# Patient Record
Sex: FEMALE | Race: WHITE | NOT HISPANIC OR LATINO | Employment: FULL TIME | URBAN - METROPOLITAN AREA
[De-identification: names, ages, dates, MRNs, and addresses within clinical notes are randomized per-mention and may not be internally consistent; named-entity substitution may affect disease eponyms.]

---

## 2019-11-11 ENCOUNTER — LAB (OUTPATIENT)
Dept: LAB | Facility: HOSPITAL | Age: 39
End: 2019-11-11

## 2019-11-11 ENCOUNTER — TRANSCRIBE ORDERS (OUTPATIENT)
Dept: LAB | Facility: HOSPITAL | Age: 39
End: 2019-11-11

## 2019-11-11 DIAGNOSIS — R63.5 ABNORMAL WEIGHT GAIN: ICD-10-CM

## 2019-11-11 DIAGNOSIS — R63.5 ABNORMAL WEIGHT GAIN: Primary | ICD-10-CM

## 2019-11-11 LAB
25(OH)D3 SERPL-MCNC: 25.3 NG/ML (ref 30–100)
ALBUMIN SERPL-MCNC: 3.9 G/DL (ref 3.5–5.2)
ALBUMIN/GLOB SERPL: 1.2 G/DL
ALP SERPL-CCNC: 60 U/L (ref 39–117)
ALT SERPL W P-5'-P-CCNC: 9 U/L (ref 1–33)
ANION GAP SERPL CALCULATED.3IONS-SCNC: 9.2 MMOL/L (ref 5–15)
AST SERPL-CCNC: 15 U/L (ref 1–32)
BILIRUB SERPL-MCNC: 0.5 MG/DL (ref 0.2–1.2)
BUN BLD-MCNC: 13 MG/DL (ref 6–20)
BUN/CREAT SERPL: 20.6 (ref 7–25)
CALCIUM SPEC-SCNC: 9.1 MG/DL (ref 8.6–10.5)
CHLORIDE SERPL-SCNC: 104 MMOL/L (ref 98–107)
CHOLEST SERPL-MCNC: 178 MG/DL (ref 0–200)
CO2 SERPL-SCNC: 28.8 MMOL/L (ref 22–29)
CREAT BLD-MCNC: 0.63 MG/DL (ref 0.57–1)
DEPRECATED RDW RBC AUTO: 40.5 FL (ref 37–54)
ERYTHROCYTE [DISTWIDTH] IN BLOOD BY AUTOMATED COUNT: 11.9 % (ref 12.3–15.4)
GFR SERPL CREATININE-BSD FRML MDRD: 106 ML/MIN/1.73
GLOBULIN UR ELPH-MCNC: 3.3 GM/DL
GLUCOSE BLD-MCNC: 75 MG/DL (ref 65–99)
HBA1C MFR BLD: 4.9 % (ref 4.8–5.6)
HCT VFR BLD AUTO: 37.1 % (ref 34–46.6)
HDLC SERPL-MCNC: 53 MG/DL (ref 40–60)
HGB BLD-MCNC: 12.5 G/DL (ref 12–15.9)
LDLC SERPL CALC-MCNC: 114 MG/DL (ref 0–100)
LDLC/HDLC SERPL: 2.16 {RATIO}
MCH RBC QN AUTO: 31.1 PG (ref 26.6–33)
MCHC RBC AUTO-ENTMCNC: 33.7 G/DL (ref 31.5–35.7)
MCV RBC AUTO: 92.3 FL (ref 79–97)
PLATELET # BLD AUTO: 317 10*3/MM3 (ref 140–450)
PMV BLD AUTO: 9.8 FL (ref 6–12)
POTASSIUM BLD-SCNC: 4.4 MMOL/L (ref 3.5–5.2)
PROT SERPL-MCNC: 7.2 G/DL (ref 6–8.5)
RBC # BLD AUTO: 4.02 10*6/MM3 (ref 3.77–5.28)
SODIUM BLD-SCNC: 142 MMOL/L (ref 136–145)
TRIGL SERPL-MCNC: 53 MG/DL (ref 0–150)
TSH SERPL DL<=0.05 MIU/L-ACNC: 2.45 UIU/ML (ref 0.27–4.2)
VLDLC SERPL-MCNC: 10.6 MG/DL (ref 5–40)
WBC NRBC COR # BLD: 10.26 10*3/MM3 (ref 3.4–10.8)

## 2019-11-11 PROCEDURE — 80053 COMPREHEN METABOLIC PANEL: CPT | Performed by: PHYSICIAN ASSISTANT

## 2019-11-11 PROCEDURE — 80061 LIPID PANEL: CPT

## 2019-11-11 PROCEDURE — 36415 COLL VENOUS BLD VENIPUNCTURE: CPT | Performed by: PHYSICIAN ASSISTANT

## 2019-11-11 PROCEDURE — 83036 HEMOGLOBIN GLYCOSYLATED A1C: CPT

## 2019-11-11 PROCEDURE — 85027 COMPLETE CBC AUTOMATED: CPT

## 2019-11-11 PROCEDURE — 82306 VITAMIN D 25 HYDROXY: CPT

## 2019-11-11 PROCEDURE — 84443 ASSAY THYROID STIM HORMONE: CPT

## 2020-07-18 ENCOUNTER — HOSPITAL ENCOUNTER (INPATIENT)
Facility: HOSPITAL | Age: 40
LOS: 3 days | Discharge: HOME | End: 2020-07-21
Attending: EMERGENCY MEDICINE | Admitting: FAMILY MEDICINE

## 2020-07-18 ENCOUNTER — HOSPITAL ENCOUNTER (EMERGENCY)
Facility: HOSPITAL | Age: 40
Discharge: HOME | End: 2020-07-18
Attending: EMERGENCY MEDICINE | Admitting: EMERGENCY MEDICINE

## 2020-07-18 ENCOUNTER — APPOINTMENT (OUTPATIENT)
Dept: GENERAL RADIOLOGY | Facility: HOSPITAL | Age: 40
End: 2020-07-18

## 2020-07-18 VITALS
TEMPERATURE: 97.7 F | SYSTOLIC BLOOD PRESSURE: 99 MMHG | BODY MASS INDEX: 30.53 KG/M2 | RESPIRATION RATE: 16 BRPM | WEIGHT: 190 LBS | DIASTOLIC BLOOD PRESSURE: 60 MMHG | HEART RATE: 90 BPM | OXYGEN SATURATION: 98 % | HEIGHT: 66 IN

## 2020-07-18 DIAGNOSIS — L03.314 CELLULITIS OF GROIN: ICD-10-CM

## 2020-07-18 DIAGNOSIS — N75.0 INFECTED CYST OF BARTHOLIN'S GLAND DUCT: Primary | ICD-10-CM

## 2020-07-18 DIAGNOSIS — R50.9 ACUTE FEBRILE ILLNESS: Primary | ICD-10-CM

## 2020-07-18 DIAGNOSIS — N76.4 LABIAL ABSCESS: ICD-10-CM

## 2020-07-18 PROBLEM — N75.1 BARTHOLIN'S GLAND ABSCESS: Status: ACTIVE | Noted: 2020-07-18

## 2020-07-18 PROBLEM — A41.9 SEPSIS: Status: ACTIVE | Noted: 2020-07-18

## 2020-07-18 PROBLEM — E66.3 OVERWEIGHT: Chronic | Status: ACTIVE | Noted: 2020-07-18

## 2020-07-18 PROBLEM — E28.2 PCOS (POLYCYSTIC OVARIAN SYNDROME): Chronic | Status: ACTIVE | Noted: 2020-07-18

## 2020-07-18 LAB
ALBUMIN SERPL-MCNC: 4.1 G/DL (ref 3.5–5.2)
ALBUMIN/GLOB SERPL: 1.5 G/DL
ALP SERPL-CCNC: 66 U/L (ref 39–117)
ALT SERPL W P-5'-P-CCNC: 12 U/L (ref 1–33)
ANION GAP SERPL CALCULATED.3IONS-SCNC: 10 MMOL/L (ref 5–15)
AST SERPL-CCNC: 15 U/L (ref 1–32)
BASOPHILS # BLD AUTO: 0.05 10*3/MM3 (ref 0–0.2)
BASOPHILS NFR BLD AUTO: 0.2 % (ref 0–1.5)
BILIRUB SERPL-MCNC: 0.5 MG/DL (ref 0–1.2)
BILIRUB UR QL STRIP: NEGATIVE
BUN SERPL-MCNC: 11 MG/DL (ref 6–20)
BUN/CREAT SERPL: 16.9 (ref 7–25)
CALCIUM SPEC-SCNC: 8.8 MG/DL (ref 8.6–10.5)
CHLORIDE SERPL-SCNC: 102 MMOL/L (ref 98–107)
CLARITY UR: CLEAR
CO2 SERPL-SCNC: 22 MMOL/L (ref 22–29)
COLOR UR: YELLOW
CREAT SERPL-MCNC: 0.65 MG/DL (ref 0.57–1)
D-LACTATE SERPL-SCNC: 0.7 MMOL/L (ref 0.5–2)
DEPRECATED RDW RBC AUTO: 38.6 FL (ref 37–54)
EOSINOPHIL # BLD AUTO: 0.01 10*3/MM3 (ref 0–0.4)
EOSINOPHIL NFR BLD AUTO: 0 % (ref 0.3–6.2)
ERYTHROCYTE [DISTWIDTH] IN BLOOD BY AUTOMATED COUNT: 11.9 % (ref 12.3–15.4)
GFR SERPL CREATININE-BSD FRML MDRD: 103 ML/MIN/1.73
GLOBULIN UR ELPH-MCNC: 2.8 GM/DL
GLUCOSE SERPL-MCNC: 89 MG/DL (ref 65–99)
GLUCOSE UR STRIP-MCNC: NEGATIVE MG/DL
HCT VFR BLD AUTO: 37.7 % (ref 34–46.6)
HGB BLD-MCNC: 13 G/DL (ref 12–15.9)
HGB UR QL STRIP.AUTO: NEGATIVE
IMM GRANULOCYTES # BLD AUTO: 0.18 10*3/MM3 (ref 0–0.05)
IMM GRANULOCYTES NFR BLD AUTO: 0.9 % (ref 0–0.5)
KETONES UR QL STRIP: NEGATIVE
LEUKOCYTE ESTERASE UR QL STRIP.AUTO: NEGATIVE
LYMPHOCYTES # BLD AUTO: 0.92 10*3/MM3 (ref 0.7–3.1)
LYMPHOCYTES NFR BLD AUTO: 4.4 % (ref 19.6–45.3)
MCH RBC QN AUTO: 31 PG (ref 26.6–33)
MCHC RBC AUTO-ENTMCNC: 34.5 G/DL (ref 31.5–35.7)
MCV RBC AUTO: 90 FL (ref 79–97)
MONOCYTES # BLD AUTO: 1.45 10*3/MM3 (ref 0.1–0.9)
MONOCYTES NFR BLD AUTO: 6.9 % (ref 5–12)
NEUTROPHILS NFR BLD AUTO: 18.44 10*3/MM3 (ref 1.7–7)
NEUTROPHILS NFR BLD AUTO: 87.6 % (ref 42.7–76)
NITRITE UR QL STRIP: NEGATIVE
NRBC BLD AUTO-RTO: 0 /100 WBC (ref 0–0.2)
PH UR STRIP.AUTO: 7 [PH] (ref 5–8)
PLATELET # BLD AUTO: 298 10*3/MM3 (ref 140–450)
PMV BLD AUTO: 9.5 FL (ref 6–12)
POTASSIUM SERPL-SCNC: 3.8 MMOL/L (ref 3.5–5.2)
PROCALCITONIN SERPL-MCNC: 0.07 NG/ML (ref 0–0.25)
PROT SERPL-MCNC: 6.9 G/DL (ref 6–8.5)
PROT UR QL STRIP: NEGATIVE
RBC # BLD AUTO: 4.19 10*6/MM3 (ref 3.77–5.28)
SODIUM SERPL-SCNC: 134 MMOL/L (ref 136–145)
SP GR UR STRIP: 1.02 (ref 1–1.03)
UROBILINOGEN UR QL STRIP: NORMAL
WBC # BLD AUTO: 21.05 10*3/MM3 (ref 3.4–10.8)

## 2020-07-18 PROCEDURE — 85025 COMPLETE CBC W/AUTO DIFF WBC: CPT | Performed by: EMERGENCY MEDICINE

## 2020-07-18 PROCEDURE — 25010000002 PIPERACILLIN SOD-TAZOBACTAM PER 1 G: Performed by: EMERGENCY MEDICINE

## 2020-07-18 PROCEDURE — 25010000002 ONDANSETRON PER 1 MG: Performed by: EMERGENCY MEDICINE

## 2020-07-18 PROCEDURE — 99284 EMERGENCY DEPT VISIT MOD MDM: CPT

## 2020-07-18 PROCEDURE — 25010000002 HYDROMORPHONE PER 4 MG: Performed by: NURSE PRACTITIONER

## 2020-07-18 PROCEDURE — 80053 COMPREHEN METABOLIC PANEL: CPT | Performed by: EMERGENCY MEDICINE

## 2020-07-18 PROCEDURE — 71045 X-RAY EXAM CHEST 1 VIEW: CPT

## 2020-07-18 PROCEDURE — 83605 ASSAY OF LACTIC ACID: CPT | Performed by: EMERGENCY MEDICINE

## 2020-07-18 PROCEDURE — 81003 URINALYSIS AUTO W/O SCOPE: CPT | Performed by: EMERGENCY MEDICINE

## 2020-07-18 PROCEDURE — 99223 1ST HOSP IP/OBS HIGH 75: CPT | Performed by: INTERNAL MEDICINE

## 2020-07-18 PROCEDURE — 25010000002 HYDROMORPHONE PER 4 MG: Performed by: EMERGENCY MEDICINE

## 2020-07-18 PROCEDURE — 87040 BLOOD CULTURE FOR BACTERIA: CPT | Performed by: EMERGENCY MEDICINE

## 2020-07-18 PROCEDURE — 25010000002 VANCOMYCIN 10 G RECONSTITUTED SOLUTION: Performed by: EMERGENCY MEDICINE

## 2020-07-18 PROCEDURE — 36415 COLL VENOUS BLD VENIPUNCTURE: CPT

## 2020-07-18 PROCEDURE — 84145 PROCALCITONIN (PCT): CPT | Performed by: NURSE PRACTITIONER

## 2020-07-18 PROCEDURE — 25010000002 DIPHENHYDRAMINE PER 50 MG: Performed by: INTERNAL MEDICINE

## 2020-07-18 RX ORDER — SULFAMETHOXAZOLE AND TRIMETHOPRIM 800; 160 MG/1; MG/1
2 TABLET ORAL 2 TIMES DAILY
Qty: 28 TABLET | Refills: 0 | Status: SHIPPED | OUTPATIENT
Start: 2020-07-18 | End: 2020-07-18

## 2020-07-18 RX ORDER — ACETAMINOPHEN 500 MG
1000 TABLET ORAL ONCE
Status: COMPLETED | OUTPATIENT
Start: 2020-07-18 | End: 2020-07-18

## 2020-07-18 RX ORDER — ONDANSETRON 2 MG/ML
4 INJECTION INTRAMUSCULAR; INTRAVENOUS EVERY 6 HOURS PRN
Status: DISCONTINUED | OUTPATIENT
Start: 2020-07-18 | End: 2020-07-21 | Stop reason: HOSPADM

## 2020-07-18 RX ORDER — TRAMADOL HYDROCHLORIDE 50 MG/1
50 TABLET ORAL EVERY 6 HOURS PRN
Status: DISCONTINUED | OUTPATIENT
Start: 2020-07-18 | End: 2020-07-21 | Stop reason: HOSPADM

## 2020-07-18 RX ORDER — ACETAMINOPHEN 325 MG/1
650 TABLET ORAL EVERY 4 HOURS PRN
Status: DISCONTINUED | OUTPATIENT
Start: 2020-07-18 | End: 2020-07-21 | Stop reason: HOSPADM

## 2020-07-18 RX ORDER — SODIUM CHLORIDE 0.9 % (FLUSH) 0.9 %
10 SYRINGE (ML) INJECTION AS NEEDED
Status: DISCONTINUED | OUTPATIENT
Start: 2020-07-18 | End: 2020-07-21 | Stop reason: HOSPADM

## 2020-07-18 RX ORDER — OXYCODONE HYDROCHLORIDE AND ACETAMINOPHEN 5; 325 MG/1; MG/1
1 TABLET ORAL EVERY 4 HOURS PRN
Qty: 15 TABLET | Refills: 0 | Status: SHIPPED | OUTPATIENT
Start: 2020-07-18 | End: 2020-07-18

## 2020-07-18 RX ORDER — HYDROMORPHONE HYDROCHLORIDE 1 MG/ML
0.5 INJECTION, SOLUTION INTRAMUSCULAR; INTRAVENOUS; SUBCUTANEOUS
Status: DISCONTINUED | OUTPATIENT
Start: 2020-07-18 | End: 2020-07-21 | Stop reason: HOSPADM

## 2020-07-18 RX ORDER — OXYCODONE HYDROCHLORIDE AND ACETAMINOPHEN 5; 325 MG/1; MG/1
1 TABLET ORAL ONCE
Status: COMPLETED | OUTPATIENT
Start: 2020-07-18 | End: 2020-07-18

## 2020-07-18 RX ORDER — NALOXONE HCL 0.4 MG/ML
0.4 VIAL (ML) INJECTION
Status: DISCONTINUED | OUTPATIENT
Start: 2020-07-18 | End: 2020-07-21 | Stop reason: HOSPADM

## 2020-07-18 RX ORDER — ONDANSETRON 2 MG/ML
4 INJECTION INTRAMUSCULAR; INTRAVENOUS ONCE
Status: COMPLETED | OUTPATIENT
Start: 2020-07-18 | End: 2020-07-18

## 2020-07-18 RX ORDER — HYDROMORPHONE HYDROCHLORIDE 1 MG/ML
0.5 INJECTION, SOLUTION INTRAMUSCULAR; INTRAVENOUS; SUBCUTANEOUS ONCE
Status: COMPLETED | OUTPATIENT
Start: 2020-07-18 | End: 2020-07-18

## 2020-07-18 RX ORDER — SODIUM CHLORIDE 0.9 % (FLUSH) 0.9 %
10 SYRINGE (ML) INJECTION EVERY 12 HOURS SCHEDULED
Status: DISCONTINUED | OUTPATIENT
Start: 2020-07-18 | End: 2020-07-21 | Stop reason: HOSPADM

## 2020-07-18 RX ORDER — LIDOCAINE HYDROCHLORIDE AND EPINEPHRINE 10; 10 MG/ML; UG/ML
10 INJECTION, SOLUTION INFILTRATION; PERINEURAL ONCE
Status: COMPLETED | OUTPATIENT
Start: 2020-07-18 | End: 2020-07-18

## 2020-07-18 RX ORDER — DIPHENHYDRAMINE HYDROCHLORIDE 50 MG/ML
25 INJECTION INTRAMUSCULAR; INTRAVENOUS ONCE
Status: COMPLETED | OUTPATIENT
Start: 2020-07-18 | End: 2020-07-18

## 2020-07-18 RX ORDER — ONDANSETRON 4 MG/1
4 TABLET, FILM COATED ORAL EVERY 6 HOURS PRN
Status: DISCONTINUED | OUTPATIENT
Start: 2020-07-18 | End: 2020-07-21 | Stop reason: HOSPADM

## 2020-07-18 RX ORDER — SPIRONOLACTONE 100 MG/1
100 TABLET, FILM COATED ORAL
Status: ON HOLD | COMMUNITY
End: 2020-07-21 | Stop reason: SDUPTHER

## 2020-07-18 RX ORDER — CEPHALEXIN 500 MG/1
500 CAPSULE ORAL 4 TIMES DAILY
Qty: 28 CAPSULE | Refills: 0 | Status: SHIPPED | OUTPATIENT
Start: 2020-07-18 | End: 2020-07-18

## 2020-07-18 RX ORDER — SODIUM CHLORIDE 9 MG/ML
100 INJECTION, SOLUTION INTRAVENOUS CONTINUOUS
Status: DISCONTINUED | OUTPATIENT
Start: 2020-07-18 | End: 2020-07-19

## 2020-07-18 RX ADMIN — HYDROMORPHONE HYDROCHLORIDE 0.5 MG: 1 INJECTION, SOLUTION INTRAMUSCULAR; INTRAVENOUS; SUBCUTANEOUS at 16:20

## 2020-07-18 RX ADMIN — TAZOBACTAM SODIUM AND PIPERACILLIN SODIUM 3.38 G: 375; 3 INJECTION, SOLUTION INTRAVENOUS at 16:21

## 2020-07-18 RX ADMIN — HYDROMORPHONE HYDROCHLORIDE 0.5 MG: 1 INJECTION, SOLUTION INTRAMUSCULAR; INTRAVENOUS; SUBCUTANEOUS at 19:05

## 2020-07-18 RX ADMIN — TAZOBACTAM SODIUM AND PIPERACILLIN SODIUM 3.38 G: 375; 3 INJECTION, SOLUTION INTRAVENOUS at 23:28

## 2020-07-18 RX ADMIN — ONDANSETRON 4 MG: 2 INJECTION INTRAMUSCULAR; INTRAVENOUS at 16:20

## 2020-07-18 RX ADMIN — SODIUM CHLORIDE 1000 ML: 9 INJECTION, SOLUTION INTRAVENOUS at 16:18

## 2020-07-18 RX ADMIN — OXYCODONE HYDROCHLORIDE AND ACETAMINOPHEN 1 TABLET: 5; 325 TABLET ORAL at 05:12

## 2020-07-18 RX ADMIN — DIPHENHYDRAMINE HYDROCHLORIDE 25 MG: 50 INJECTION INTRAMUSCULAR; INTRAVENOUS at 19:02

## 2020-07-18 RX ADMIN — ACETAMINOPHEN 650 MG: 325 TABLET, FILM COATED ORAL at 23:27

## 2020-07-18 RX ADMIN — HYDROMORPHONE HYDROCHLORIDE 0.5 MG: 1 INJECTION, SOLUTION INTRAMUSCULAR; INTRAVENOUS; SUBCUTANEOUS at 23:32

## 2020-07-18 RX ADMIN — VANCOMYCIN HYDROCHLORIDE 1750 MG: 10 INJECTION, POWDER, LYOPHILIZED, FOR SOLUTION INTRAVENOUS at 17:23

## 2020-07-18 RX ADMIN — LIDOCAINE HYDROCHLORIDE,EPINEPHRINE BITARTRATE 10 ML: 10; .01 INJECTION, SOLUTION INFILTRATION; PERINEURAL at 06:20

## 2020-07-18 RX ADMIN — SODIUM CHLORIDE 100 ML/HR: 9 INJECTION, SOLUTION INTRAVENOUS at 19:05

## 2020-07-18 RX ADMIN — ACETAMINOPHEN 1000 MG: 500 TABLET, FILM COATED ORAL at 17:23

## 2020-07-18 NOTE — H&P
[facility] Medicine Services  HISTORY AND PHYSICAL    Patient Name: Margarita Adair  : 1980  MRN: [id number]  Primary Care Physician: Nadira Pritchard APRN  Date of admission: 2020      Subjective   Subjective     Chief Complaint:  Rt labial abscess with associated n/v, fever     HPI:  Margarita Adair is a 39 y.o. female with pmh of PCOS, fatty liver, overweight and remote tobacco abuse (quit in ).  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  As a note, pt had a known COVID exposure on , tested negative on  and has worked throughout without any fever or respiratory s/s, and no loss of taste or smell.  She was under daily monitoring of temp and symptoms until 2 days ago when she was release as her 14 days had passed.     Today pt presents to ER this am with cc of Rt labial lesion that was pea sized and appeared on Friday.  Pain worsened and yesterday the lesion was approx grape sized.  No fever, chills, n/v, cough, h/a, diarrhea.  Pain worsened and she was seen in our ER this am and dxd with Rt bartholin's gland abscess.  Lesion was lanced and pt was started on Bactrim DS and given percocet.  Dcd home.  Later today she developed fever of 102 and n/v with worsening rt groin pain.  She came back to ER.  Dxd with sepsis associated with abscess.  Blood cx pending. Started on Vanc/zosyn and IVFs. Will admit to hospitalist service and consult ID and gyn.      Review of Systems   Constitutional: Positive for activity change, chills and fever. Negative for fatigue.   HENT: Negative for congestion, sinus pressure, sinus pain, sore throat and trouble swallowing.         No loss of taste or smell    Eyes: Negative.    Respiratory: Negative for cough, chest tightness, shortness of breath and wheezing.    Cardiovascular: Negative.    Gastrointestinal: Positive for nausea and vomiting. Negative for abdominal pain and diarrhea.   Endocrine:  Negative.    Genitourinary: Negative.    Musculoskeletal: Negative.    Skin: Positive for wound.        Rt labial abscess s/p I&D in ER this am with packing.  Very tender rt groin    Allergic/Immunologic: Negative.    Neurological: Negative.    Hematological: Negative.    Psychiatric/Behavioral: Negative.         All other systems reviewed and are negative.     Personal History     Past Medical History:   Diagnosis Date   • Fatty liver    • Overweight    • PCOS (polycystic ovarian syndrome)        Past Surgical History:   Procedure Laterality Date   • ADENOIDECTOMY     • APPENDECTOMY     • CHOLECYSTECTOMY     • TONSILLECTOMY         Family History: family history includes ADD / ADHD in her son; Autism in her daughter; Breast cancer in her mother; Hypertension in her father; No Known Problems in her brother. Otherwise pertinent FHx was reviewed and unremarkable.     Social History:  reports that she quit smoking about 14 years ago. She has never used smokeless tobacco. She reports that she drinks alcohol. She reports that she does not use drugs.  Social History     Social History Narrative   • Not on file       Medications:  Available home medication information reviewed.  Medications Prior to Admission   Medication Sig Dispense Refill Last Dose   • spironolactone (ALDACTONE) 100 MG tablet 100 mg.   7/17/2020 at Unknown time       Allergies   Allergen Reactions   • Oxycodone GI Intolerance       Objective   Objective     Vital Signs:   Temp:  [97.7 °F (36.5 °C)-102.1 °F (38.9 °C)] 99.6 °F (37.6 °C)  Heart Rate:  [] 88  Resp:  [16-20] 18  BP: ()/(56-97) 102/56        Physical Exam   Constitutional: Awake, alert. Sitting up in bed in ER in [facility].  No visitors at bs.   Eyes: PERRLA, sclerae anicteric, no conjunctival injection  HENT: NCAT, mucous membranes moist  Neck: Supple, no thyromegaly, no lymphadenopathy, trachea midline  Respiratory: Clear to auscultation bilaterally A/P, nonlabored respirations on RA  (note pt is wearing her own SN95 mask in ER on exam)  Cardiovascular: sinus tach, no murmurs, rubs, or gallops, palpable pedal pulses bilaterally  Gastrointestinal: Positive bowel sounds, soft, nontender, nondistended. Overweight  Musculoskeletal: No bilateral ankle edema, no clubbing or cyanosis to extremities. FERGUSON spontaneously   Psychiatric: Appropriate affect, cooperative and calm   Neurologic: Oriented x 3, strength symmetric in all extremities, Cranial Nerves grossly intact to confrontation, speech clear and appropriate.  Follows commands   Skin: Rt labia majora with posterior area of mild erythema. Also noted area lanced and wick/packing in place.  VERY ttp to rt groin. Unable to assess adequately for lymphadenopathy due to significant pain.       Results Reviewed:  I have personally reviewed current lab and radiology data.    Results from last 7 days   Lab Units 07/18/20  1559   WBC 10*3/mm3 21.05*   HEMOGLOBIN g/dL 13.0   HEMATOCRIT % 37.7   PLATELETS 10*3/mm3 298     Results from last 7 days   Lab Units 07/18/20  1559   SODIUM mmol/L 134*   POTASSIUM mmol/L 3.8   CHLORIDE mmol/L 102   CO2 mmol/L 22.0   BUN mg/dL 11   CREATININE mg/dL 0.65   GLUCOSE mg/dL 89   CALCIUM mg/dL 8.8   ALT (SGPT) U/L 12   AST (SGOT) U/L 15   LACTATE mmol/L 0.7   PROCALCITONIN ng/mL 0.07     Estimated Creatinine Clearance: 131.1 mL/min (by C-G formula based on SCr of 0.65 mg/dL).  Brief Urine Lab Results  (Last result in the past 365 days)      Color   Clarity   Blood   Leuk Est   Nitrite   Protein   CREAT   Urine HCG        07/18/20 1618 Yellow Clear Negative Negative Negative Negative             Imaging Results (Last 24 Hours)     Procedure Component Value Units Date/Time    XR Chest 1 View [[id number]] Collected:  07/18/20 1557     Updated:  07/18/20 1825    Narrative:          EXAMINATION: XR CHEST 1 VW - 07/18/2020     INDICATION: Fever      COMPARISON: NONE     FINDINGS: Portable chest reveals cardiac and mediastinal  silhouettes  within normal limits. The lung fields are clear. No focal parenchymal  opacification is present. No pleural effusion or pneumothorax. The bony  structures are unremarkable. The pulmonary vascularity is within normal  limits.         Impression:       No acute cardiopulmonary disease.     DICTATED:   07/18/2020  EDITED/ls :   07/18/2020                    Assessment/Plan   Assessment & Plan     Active Hospital Problems    Diagnosis POA   • **Labial abscess [N76.4] Unknown     Priority: High   • Sepsis (CMS/HCC) [A41.9] Yes     Priority: High   • PCOS (polycystic ovarian syndrome) [E28.2] Unknown   • Overweight [E66.3] Unknown     Margarita Adair is a 39 y.o. female with pmh of PCOS, fatty liver, overweight and remote tobacco abuse (quit in 2006).  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  As a note, pt had a known COVID exposure on 7/1, tested negative on 7/2 and has worked throughout without any fever or respiratory s/s, and no loss of taste or smell.  She was under daily monitoring of temp and symptoms until 2 days ago when she was release as her 14 days had passed.     Today pt presents to ER this am with cc of Rt labial lesion that was pea sized and appeared on Friday.  Pain worsened and yesterday the lesion was approx grape sized.  No fever, chills, n/v, cough, h/a, diarrhea. She reports she has some left over bactrim and had taken 2 doses prior to coming to ER today.  Pain worsened and she was seen in our ER this am and dxd with Rt bartholin's gland abscess.  Lesion was lanced/packed and pt was started on Bactrim DS and given percocet.  Dcd home.  Later today she developed fever of 102 and n/v with worsening rt groin pain.  She came back to ER.  Dxd with sepsis associated with abscess.  Blood cx pending. Started on Vanc/zosyn and IVFs. Will admit to hospitalist service and consult ID and gyn.      Assessment/plan:    Sepsis (fever, tachycardia,  leukocytosis)   (tachy, mild hypotension, WBC 21.05, fever 102.1 and source)  Right labia majora abscess/cellulitis  Rt labia pain  --s/p I&D and packed in ER this am. Was sent home with percocet and bactrim DS  --back to ER this afternoon worsening.  --bld cx x 2 pending, procal and lactate wnl  --CXR wnl  --WBC 21.05  --started on Vanc/zosyn. 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 Sats wnl.   --no current covid s/s, with new fever associated with Rt labial abscess and sepsis.      Hx PCOS  --sees Dr Janet WOODALLPN outpt     -----------------------------------------------------    Plan:  -zosyn & vanc  -follow blood cultures  -iv fluids  -analgesics & antiemetics  -right vulva incision & packing in ED on 7/18/20 a.m.; Dr. Lynn (gynecology) has kindly agreed to see patient tomorrow  -am consult ID (to assist in antibiotic choice, earlier discharge, etc)      Am labs: cbc,bmp      DVT prophylaxis:  Sequentials    CODE STATUS:    Code Status and Medical Interventions:   Ordered at: 07/18/20 1846     Level Of Support Discussed With:    Patient     Code Status:    CPR     Medical Interventions (Level of Support Prior to Arrest):    Full       Admission Status:  I believe this patient meets inpatient status due to need for iv antibiotics, fluids, expected stay > 2 midnights    Electronically signed by MARCELLO Ramirez, 07/18/20, 6:47 PM.      Attending   Admission Attestation       I have seen and examined the patient, performing an independent face-to-face diagnostic evaluation with plan of care reviewed and developed with the advanced practice clinician  "(APC).      Brief Summary Statement:   Margarita Adair is a 39 y.o. female presents with right vulvar painful lesion 2 days ago, presented to [facility] ED earlier today at which time had incision/drainage and packing performed and was discharged w/ bactrim. After leaving developed n/v, fever 102 and returned. Wbc 21,000, temp 102, tachycardic. Given zosyn, vanc and admitted to hospitalist service. 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    Remainder of detailed HPI is as noted by APC and has been reviewed and/or edited by me for completeness.    Attending Physical Exam:  Constitutional:Alert, oriented x 3, nontoxic appearing  Psych:Normal/appropriate affect  HEENT:Ncat, oroph clear  Neck: neck supple, full range of motion  Neuro: Face symmetric, speech clear, equal , moves all extremities  Cardiac: Rrr; No pretibial pitting edema  Resp: Ctab, normal effort  GI: abd soft, nontender  Skin: No extremity rash  Musculoskeletal/extremities: no cyanosis extremities; no significant ankle edema  Genital: right vulva edematous, red, tender, packing in place        Brief Assessment/Plan :  See detailed assessment and plan developed with APC which I have reviewed and/or edited for completeness.    Electronically signed by Delano Ma MD, 07/18/20, 7:47 PM.            "

## 2020-07-18 NOTE — ED PROVIDER NOTES
Subjective   39-year-old female who presents for evaluation of right labial majora pain and swelling.  She reports that on Thursday she had a pea-sized lesion developed there.  Yesterday morning it was grape size in nature and has continued to increase in size and pain.  She has tried warm compresses, cleaning the area, and over-the-counter pain medication without any relief of symptoms.  She reports feeling as if the lymph nodes along her right inguinal crease region have began to swell.  She denies systemic symptoms of infection include no fever, bodies, or chills.  She denies any new respiratory symptoms.  No other reported aggravating, alleviating, or associated symptoms.          Review of Systems   Constitutional: Negative for chills, fatigue and fever.   HENT: Negative for congestion, ear pain, postnasal drip, sinus pressure and sore throat.    Eyes: Negative for pain, redness and visual disturbance.   Respiratory: Negative for cough, chest tightness and shortness of breath.    Cardiovascular: Negative for chest pain, palpitations and leg swelling.   Gastrointestinal: Negative for abdominal pain, anal bleeding, blood in stool, diarrhea, nausea and vomiting.   Endocrine: Negative for polydipsia and polyuria.   Genitourinary: Positive for vaginal pain. Negative for difficulty urinating, dysuria, frequency and urgency.   Musculoskeletal: Negative for arthralgias, back pain and neck pain.   Skin: Negative for pallor and rash.   Allergic/Immunologic: Negative for environmental allergies and immunocompromised state.   Neurological: Negative for dizziness, weakness and headaches.   Hematological: Negative for adenopathy.   Psychiatric/Behavioral: Negative for confusion, self-injury and suicidal ideas. The patient is not nervous/anxious.    All other systems reviewed and are negative.      Past Medical History:   Diagnosis Date   • Fatty liver    • PCOS (polycystic ovarian syndrome)        No Known  Allergies    History reviewed. No pertinent surgical history.    History reviewed. No pertinent family history.    Social History     Socioeconomic History   • Marital status:      Spouse name: Not on file   • Number of children: Not on file   • Years of education: Not on file   • Highest education level: Not on file           Objective   Physical Exam   Constitutional: She is oriented to person, place, and time. She appears well-developed and well-nourished.  Non-toxic appearance. No distress.   HENT:   Head: Normocephalic and atraumatic.   Right Ear: External ear normal.   Left Ear: External ear normal.   Nose: Nose normal.   Eyes: Pupils are equal, round, and reactive to light. EOM and lids are normal.   Neck: Normal range of motion. Neck supple. No tracheal deviation present.   Cardiovascular: Normal rate, regular rhythm and normal heart sounds. Exam reveals no gallop, no friction rub and no decreased pulses.   No murmur heard.  Pulmonary/Chest: Effort normal and breath sounds normal. No respiratory distress. She has no decreased breath sounds. She has no wheezes. She has no rhonchi. She has no rales.   Abdominal: Soft. Normal appearance and bowel sounds are normal. There is no tenderness. There is no rebound and no guarding.   Genitourinary:       Pelvic exam was performed with patient prone. No labial fusion. There is tenderness and lesion on the right labia. There is no injury on the right labia.   Musculoskeletal: Normal range of motion. She exhibits no deformity.   Lymphadenopathy:     She has no cervical adenopathy.   Neurological: She is alert and oriented to person, place, and time. She has normal strength. No cranial nerve deficit or sensory deficit.   Skin: Skin is warm and dry. No rash noted. She is not diaphoretic.   Psychiatric: She has a normal mood and affect. Her speech is normal and behavior is normal. Judgment and thought content normal. Cognition and memory are normal.   Nursing note  and vitals reviewed.      Incision & Drainage  Date/Time: 7/18/2020 7:11 AM  Performed by: Brian Peacock MD  Authorized by: Brian Peacock MD     Consent:     Consent obtained:  Verbal    Consent given by:  Patient    Risks discussed:  Bleeding, incomplete drainage, pain and infection    Alternatives discussed:  No treatment and alternative treatment  Location:     Type:  Abscess    Size:  2 cm    Location:  Anogenital    Anogenital location:  Bartholin's gland  Pre-procedure details:     Skin preparation:  Betadine  Anesthesia (see MAR for exact dosages):     Anesthesia method:  Local infiltration    Local anesthetic:  Lidocaine 1% WITH epi  Procedure type:     Complexity:  Simple  Procedure details:     Needle aspiration: no      Incision types:  Single straight    Incision depth:  Subcutaneous    Scalpel blade:  11    Wound management:  Probed and deloculated    Drainage:  Purulent    Drainage amount:  Moderate    Wound treatment:  Wound left open    Packing materials:  1/4 in gauze  Post-procedure details:     Patient tolerance of procedure:  Tolerated well, no immediate complications               ED Course                                           MDM  Number of Diagnoses or Management Options  Infected cyst of Bartholin's gland duct: new and requires workup  Diagnosis management comments: Incision and drainage was performed and packing placed.  Patient tolerated procedure well.    The patient was discharged with Percocet for pain, Bactrim and Keflex for antibiotic treatment.    She will be advised to follow-up with her gynecologist, Dr. Janet Farmer. for further outpatient evaluation within the next 2 to 3 days.       Amount and/or Complexity of Data Reviewed  Review and summarize past medical records: yes  Independent visualization of images, tracings, or specimens: yes        Final diagnoses:   Infected cyst of Bartholin's gland duct            Brian Peacock MD  07/18/20 7218

## 2020-07-18 NOTE — DISCHARGE INSTRUCTIONS
Take antibiotics as prescribed.    Take Percocet as needed for pain.    Do not drive or operate machinery while taking Percocet.    Follow-up with gynecology for repeat evaluation within the next 2 to 3 days.    Return to the ER immediately with any further concern.

## 2020-07-18 NOTE — PLAN OF CARE
Problem: Patient Care Overview  Goal: Plan of Care Review  Flowsheets  Taken 7/18/2020 1919  Progress: no change  Outcome Summary: Pt admitted to floor from ED. R groin/labial abcess was I&D'd in ED, now packed with gauze. Benadryl given for possible reaction to vanc, NS at 100 ml/hr started. Will continue to monitor.  Taken 7/18/2020 1905  Plan of Care Reviewed With: patient

## 2020-07-18 NOTE — PROGRESS NOTES
"Pharmacy Consult-Vancomycin Dosing  Margarita Adair is a  39 y.o. female receiving vancomycin therapy.     Indication: Sepsis, SSTI  Consulting Provider: Hospitalist  ID Consult: No    Goal Trough:15-20 mcg/ml    Current Antimicrobial Therapy  Vancomycin - Pharmacy dosing  Zosyn 3.375g IV q8h      Allergies  Allergies as of 07/18/2020 - Reviewed 07/18/2020   Allergen Reaction Noted    Oxycodone GI Intolerance 07/18/2020       Labs    Results from last 7 days   Lab Units 07/18/20  1559   BUN mg/dL 11   CREATININE mg/dL 0.65       Results from last 7 days   Lab Units 07/18/20  1559   WBC 10*3/mm3 21.05*       Evaluation of Dosing     Last Dose Received in the ED/Outside Facility: 1750mg in ED  Is Patient on Dialysis or Renal Replacement:     Ht - 167.6 cm (66\")  Wt - 86.2 kg (190 lb)    Estimated Creatinine Clearance: 131.1 mL/min (by C-G formula based on SCr of 0.65 mg/dL).    Intake & Output (last 3 days)         07/16 0701 - 07/17 0700 07/17 0701 - 07/18 0700 07/18 0701 - 07/19 0700    IV Piggyback   1050    Total Intake(mL/kg)   1050 (12.2)    Net   +1050                   Microbiology and Radiology  Microbiology Results (last 10 days)       ** No results found for the last 240 hours. **            Evaluation of Level                 Assessment/Plan:     Pharmacy dosing Vancomycin for Sepsis, SSTI. Goal trough 15-20mcg/ml.  Vancomycin initiated with 1750mg in ED (~20mg/kg) follow with 1250mg q12hr.  Obtain trough prior to 5th total dose on 7/20.  Monitor renal function, culture results, clinical status and adjust as necessary.  Pharmacy will continue to follow.     Josep Rao, [facility]  7/18/2020  19:04   "

## 2020-07-18 NOTE — ED PROVIDER NOTES
EMERGENCY DEPARTMENT ENCOUNTER    Room Number:  18/18  Date of encounter:  7/18/2020  PCP: Provider, No Known  Historian: Patient      HPI:  Chief Complaint: Fever and right groin lesion    A complete HPI/ROS/PMH/PSH/SH/FH are unobtainable due to: N/A    Context: Margarita Adair is a 39 y.o. female who presents to the ED c/o symptoms which started roughly 48 hours ago.  She noticed a small bump on her right labia majora.  This progressed until early this morning when she came to our emergency department and underwent abscess I&D with packing performed by Dr. Peacock.  The patient had already started on antibiotics the day before presentation to the emergency department.  She had leftover Bactrim DS and had taken 2 doses prior to arrival in the emergency department.  She underwent abscess I&D and was discharged to home.  She began vomiting after leaving the emergency department and getting to the parking lot.  Since then she has remained nauseated.  She reports shaking chills with a an oral temperature greater than 102 degrees.  Her last dose of antipyretic was 3 hours ago and was ibuprofen.  She has not had any Tylenol since last night.  She rates her groin pain moderate in severity.  She took 1 Percocet but notes that she vomited relatively shortly thereafter.  She remembered that she developed severe nausea anytime she does take Percocet but had already taken the medication.  She denies cough, dyspnea, abdominal pain, dysuria/hematuria, bowel changes, COVID exposures, loss of taste or smell.      PAST MEDICAL HISTORY  Active Ambulatory Problems     Diagnosis Date Noted   • No Active Ambulatory Problems     Resolved Ambulatory Problems     Diagnosis Date Noted   • No Resolved Ambulatory Problems     Past Medical History:   Diagnosis Date   • Fatty liver    • PCOS (polycystic ovarian syndrome)          PAST SURGICAL HISTORY  No past surgical history on file.      FAMILY HISTORY  No family history on file.      SOCIAL  HISTORY  Social History     Socioeconomic History   • Marital status:      Spouse name: Not on file   • Number of children: Not on file   • Years of education: Not on file   • Highest education level: Not on file         ALLERGIES  Oxycodone        REVIEW OF SYSTEMS  Review of Systems     All systems reviewed and negative except for those discussed in HPI.       PHYSICAL EXAM    I have reviewed the triage vital signs and nursing notes.    ED Triage Vitals [07/18/20 1445]   Temp Heart Rate Resp BP SpO2   100.5 °F (38.1 °C) 112 20 116/97 97 %      Temp src Heart Rate Source Patient Position BP Location FiO2 (%)   Oral Monitor Sitting Left arm --       Physical Exam  GENERAL:  Well developed.  Appears in mild distress.  Has blanket in place but still appears chilled.  Axillary temperature checked by myself 102.1.  HENT: Nares patent  EYES: No scleral icterus  CV: Regular rhythm, regular rate  RESPIRATORY: Normal effort.  No audible wheezes, rales or rhonchi  ABDOMEN: Soft, nontender  MUSCULOSKELETAL: No deformities.   NEURO: Alert, moves all extremities, follows commands.  No nuchal rigidity.  Supple neck.  SKIN: Warm, dry, no rash visualized  GENITOURINARY:  Right inferior labia majora erythematous with mild induration but no fluctuance.  Surgical wound is present with small amount of packing at the incision site.    Lymphatics :  right inguinal lymph nodes quite tender to palpation without obvious enlargement.    LAB RESULTS  No results found for this or any previous visit (from the past 24 hour(s)).    Ordered the above labs and independently reviewed the results.        RADIOLOGY  No Radiology Exams Resulted Within Past 24 Hours          PROCEDURES    Procedures      MEDICATIONS GIVEN IN ER    Medications   sodium chloride 0.9 % bolus 1,000 mL (has no administration in time range)   vancomycin 1750 mg/500 mL 0.9% NS IVPB ([facility]) (has no administration in time range)   piperacillin-tazobactam (ZOSYN) 3.375 g in  iso-osmotic dextrose 50 ml (premix) (has no administration in time range)         PROGRESS, DATA ANALYSIS, CONSULTS, AND MEDICAL DECISION MAKING    All labs have been independently reviewed by me.  All radiology studies have been reviewed by me and the radiologist dictating the report.   EKG's have been independently viewed and interpreted by me.            ED Course as of Jul 18 2335   Sat Jul 18, 2020   1659 I have paged the hospitalist for admission.    [MS]   1700 I have ordered broad-spectrum antibiotics as well as pain and nausea medicine.  We are infusing a liter normal saline.  We have will administer Tylenol.    [MS]      ED Course User Index  [MS] Golden Hedrick MD             AS OF 15:23 VITALS:    BP - 116/97  HR - 112  TEMP - 100.5 °F (38.1 °C) (Oral)  O2 SATS - 97%        DIAGNOSIS  Final diagnoses:   Acute febrile illness   Labial abscess   Cellulitis of groin         DISPOSITION  Admission           Golden Hedrick MD  07/18/20 2727

## 2020-07-19 PROBLEM — D72.829 LEUKOCYTOSIS: Status: ACTIVE | Noted: 2020-07-19

## 2020-07-19 PROBLEM — R00.0 TACHYCARDIA: Status: ACTIVE | Noted: 2020-07-19

## 2020-07-19 PROBLEM — R50.9 FEVER: Status: ACTIVE | Noted: 2020-07-19

## 2020-07-19 LAB
ANION GAP SERPL CALCULATED.3IONS-SCNC: 6 MMOL/L (ref 5–15)
BASOPHILS # BLD AUTO: 0.02 10*3/MM3 (ref 0–0.2)
BASOPHILS NFR BLD AUTO: 0.2 % (ref 0–1.5)
BUN SERPL-MCNC: 5 MG/DL (ref 6–20)
BUN/CREAT SERPL: 8.9 (ref 7–25)
CALCIUM SPEC-SCNC: 7.3 MG/DL (ref 8.6–10.5)
CHLORIDE SERPL-SCNC: 109 MMOL/L (ref 98–107)
CK SERPL-CCNC: 50 U/L (ref 20–180)
CO2 SERPL-SCNC: 22 MMOL/L (ref 22–29)
CREAT SERPL-MCNC: 0.56 MG/DL (ref 0.57–1)
D-LACTATE SERPL-SCNC: 0.6 MMOL/L (ref 0.5–2)
DEPRECATED RDW RBC AUTO: 42.5 FL (ref 37–54)
EOSINOPHIL # BLD AUTO: 0.03 10*3/MM3 (ref 0–0.4)
EOSINOPHIL NFR BLD AUTO: 0.3 % (ref 0.3–6.2)
ERYTHROCYTE [DISTWIDTH] IN BLOOD BY AUTOMATED COUNT: 12.2 % (ref 12.3–15.4)
GFR SERPL CREATININE-BSD FRML MDRD: 122 ML/MIN/1.73
GLUCOSE SERPL-MCNC: 86 MG/DL (ref 65–99)
HCT VFR BLD AUTO: 35 % (ref 34–46.6)
HGB BLD-MCNC: 11.5 G/DL (ref 12–15.9)
IMM GRANULOCYTES # BLD AUTO: 0.06 10*3/MM3 (ref 0–0.05)
IMM GRANULOCYTES NFR BLD AUTO: 0.5 % (ref 0–0.5)
LYMPHOCYTES # BLD AUTO: 1.3 10*3/MM3 (ref 0.7–3.1)
LYMPHOCYTES NFR BLD AUTO: 11.2 % (ref 19.6–45.3)
MCH RBC QN AUTO: 31 PG (ref 26.6–33)
MCHC RBC AUTO-ENTMCNC: 32.9 G/DL (ref 31.5–35.7)
MCV RBC AUTO: 94.3 FL (ref 79–97)
MONOCYTES # BLD AUTO: 0.66 10*3/MM3 (ref 0.1–0.9)
MONOCYTES NFR BLD AUTO: 5.7 % (ref 5–12)
NEUTROPHILS NFR BLD AUTO: 82.1 % (ref 42.7–76)
NEUTROPHILS NFR BLD AUTO: 9.58 10*3/MM3 (ref 1.7–7)
NRBC BLD AUTO-RTO: 0 /100 WBC (ref 0–0.2)
PLATELET # BLD AUTO: 227 10*3/MM3 (ref 140–450)
PMV BLD AUTO: 10 FL (ref 6–12)
POTASSIUM SERPL-SCNC: 3.6 MMOL/L (ref 3.5–5.2)
RBC # BLD AUTO: 3.71 10*6/MM3 (ref 3.77–5.28)
SODIUM SERPL-SCNC: 137 MMOL/L (ref 136–145)
WBC # BLD AUTO: 11.65 10*3/MM3 (ref 3.4–10.8)

## 2020-07-19 PROCEDURE — 87076 CULTURE ANAEROBE IDENT EACH: CPT | Performed by: NURSE PRACTITIONER

## 2020-07-19 PROCEDURE — 25010000002 DIPHENHYDRAMINE PER 50 MG: Performed by: PHYSICIAN ASSISTANT

## 2020-07-19 PROCEDURE — 25010000002 VANCOMYCIN 10 G RECONSTITUTED SOLUTION

## 2020-07-19 PROCEDURE — 25010000002 PIPERACILLIN SOD-TAZOBACTAM PER 1 G

## 2020-07-19 PROCEDURE — 25010000002 DAPTOMYCIN PER 1 MG: Performed by: NURSE PRACTITIONER

## 2020-07-19 PROCEDURE — 99232 SBSQ HOSP IP/OBS MODERATE 35: CPT | Performed by: FAMILY MEDICINE

## 2020-07-19 PROCEDURE — 85025 COMPLETE CBC W/AUTO DIFF WBC: CPT | Performed by: NURSE PRACTITIONER

## 2020-07-19 PROCEDURE — 87070 CULTURE OTHR SPECIMN AEROBIC: CPT | Performed by: NURSE PRACTITIONER

## 2020-07-19 PROCEDURE — 25010000002 HYDROMORPHONE PER 4 MG: Performed by: NURSE PRACTITIONER

## 2020-07-19 PROCEDURE — 82550 ASSAY OF CK (CPK): CPT | Performed by: NURSE PRACTITIONER

## 2020-07-19 PROCEDURE — 80048 BASIC METABOLIC PNL TOTAL CA: CPT | Performed by: NURSE PRACTITIONER

## 2020-07-19 PROCEDURE — 87075 CULTR BACTERIA EXCEPT BLOOD: CPT | Performed by: NURSE PRACTITIONER

## 2020-07-19 PROCEDURE — 87205 SMEAR GRAM STAIN: CPT | Performed by: NURSE PRACTITIONER

## 2020-07-19 PROCEDURE — 83605 ASSAY OF LACTIC ACID: CPT | Performed by: PHYSICIAN ASSISTANT

## 2020-07-19 PROCEDURE — 25010000002 HEPARIN (PORCINE) PER 1000 UNITS: Performed by: FAMILY MEDICINE

## 2020-07-19 RX ORDER — HEPARIN SODIUM 5000 [USP'U]/ML
5000 INJECTION, SOLUTION INTRAVENOUS; SUBCUTANEOUS EVERY 8 HOURS SCHEDULED
Status: DISCONTINUED | OUTPATIENT
Start: 2020-07-19 | End: 2020-07-21 | Stop reason: HOSPADM

## 2020-07-19 RX ORDER — DOCUSATE SODIUM 100 MG/1
100 CAPSULE, LIQUID FILLED ORAL 2 TIMES DAILY PRN
Status: DISCONTINUED | OUTPATIENT
Start: 2020-07-19 | End: 2020-07-21 | Stop reason: HOSPADM

## 2020-07-19 RX ORDER — IBUPROFEN 600 MG/1
600 TABLET ORAL EVERY 6 HOURS PRN
Status: DISCONTINUED | OUTPATIENT
Start: 2020-07-19 | End: 2020-07-21 | Stop reason: HOSPADM

## 2020-07-19 RX ORDER — DIPHENHYDRAMINE HYDROCHLORIDE 50 MG/ML
12.5 INJECTION INTRAMUSCULAR; INTRAVENOUS ONCE
Status: COMPLETED | OUTPATIENT
Start: 2020-07-19 | End: 2020-07-19

## 2020-07-19 RX ADMIN — DAPTOMYCIN 300 MG: 500 INJECTION, POWDER, LYOPHILIZED, FOR SOLUTION INTRAVENOUS at 10:28

## 2020-07-19 RX ADMIN — SODIUM CHLORIDE 1000 ML: 9 INJECTION, SOLUTION INTRAVENOUS at 05:20

## 2020-07-19 RX ADMIN — SODIUM CHLORIDE 100 ML/HR: 9 INJECTION, SOLUTION INTRAVENOUS at 05:09

## 2020-07-19 RX ADMIN — DOCUSATE SODIUM 100 MG: 100 CAPSULE, LIQUID FILLED ORAL at 21:34

## 2020-07-19 RX ADMIN — TAZOBACTAM SODIUM AND PIPERACILLIN SODIUM 3.38 G: 375; 3 INJECTION, SOLUTION INTRAVENOUS at 08:12

## 2020-07-19 RX ADMIN — HYDROMORPHONE HYDROCHLORIDE 0.5 MG: 1 INJECTION, SOLUTION INTRAMUSCULAR; INTRAVENOUS; SUBCUTANEOUS at 18:04

## 2020-07-19 RX ADMIN — VANCOMYCIN HYDROCHLORIDE 1250 MG: 10 INJECTION, POWDER, LYOPHILIZED, FOR SOLUTION INTRAVENOUS at 05:09

## 2020-07-19 RX ADMIN — DIPHENHYDRAMINE HYDROCHLORIDE 12.5 MG: 50 INJECTION INTRAMUSCULAR; INTRAVENOUS at 06:42

## 2020-07-19 RX ADMIN — HYDROMORPHONE HYDROCHLORIDE 0.5 MG: 1 INJECTION, SOLUTION INTRAMUSCULAR; INTRAVENOUS; SUBCUTANEOUS at 21:35

## 2020-07-19 RX ADMIN — SODIUM CHLORIDE, PRESERVATIVE FREE 10 ML: 5 INJECTION INTRAVENOUS at 21:02

## 2020-07-19 RX ADMIN — HYDROMORPHONE HYDROCHLORIDE 0.5 MG: 1 INJECTION, SOLUTION INTRAMUSCULAR; INTRAVENOUS; SUBCUTANEOUS at 08:16

## 2020-07-19 RX ADMIN — HYDROMORPHONE HYDROCHLORIDE 0.5 MG: 1 INJECTION, SOLUTION INTRAMUSCULAR; INTRAVENOUS; SUBCUTANEOUS at 10:28

## 2020-07-19 RX ADMIN — IBUPROFEN 600 MG: 600 TABLET, FILM COATED ORAL at 21:33

## 2020-07-19 RX ADMIN — HEPARIN SODIUM 5000 UNITS: 5000 INJECTION INTRAVENOUS; SUBCUTANEOUS at 15:59

## 2020-07-19 RX ADMIN — TAZOBACTAM SODIUM AND PIPERACILLIN SODIUM 3.38 G: 375; 3 INJECTION, SOLUTION INTRAVENOUS at 15:59

## 2020-07-19 RX ADMIN — TAZOBACTAM SODIUM AND PIPERACILLIN SODIUM 3.38 G: 375; 3 INJECTION, SOLUTION INTRAVENOUS at 23:45

## 2020-07-19 RX ADMIN — ACETAMINOPHEN 650 MG: 325 TABLET, FILM COATED ORAL at 08:12

## 2020-07-19 NOTE — CONSULTS
[facility]  Margarita Adair  : 1980  MRN: [id number]  [facility]: [id number]    Consult Requested By: Delano Ma MD   Consulting Service: Gynecology   Reason for Consultation: Labial abscess   Date of consultation: 2020       Juany Adair is a 39 y.o. year old who presents through the ED for a labial abscess.  She has a history of hydradenitis suppurativa which had been maintained on daily oral tetracycline in the remote past.  She has had one abscess lanced and drained with her dermatologist, but has never required hospitalization for an abscess in the past.  She does not have diabetes.     Past Medical History:   Diagnosis Date   • Fatty liver    • Overweight    • PCOS (polycystic ovarian syndrome)      Past Surgical History:   Procedure Laterality Date   • ADENOIDECTOMY     • APPENDECTOMY     • CHOLECYSTECTOMY     • TONSILLECTOMY       OB History   No data available     Social History    Tobacco Use      Smoking status: Former Smoker        Quit date:         Years since quittin.5      Smokeless tobacco: Never Used      Current Facility-Administered Medications:   •  acetaminophen (TYLENOL) tablet 650 mg, 650 mg, Oral, Q4H PRN, Radha Fraser, APRN, 650 mg at 20 0812  •  DAPTOmycin (CUBICIN) 300 mg in sodium chloride 0.9 % 50 mL IVPB, 4 mg/kg (Adjusted), Intravenous, Q24H, David Dorsey APRN, Last Rate: 100 mL/hr at 20 1028, 300 mg at 20 1028  •  docusate sodium (COLACE) capsule 100 mg, 100 mg, Oral, BID PRN, Martha Beal MD  •  heparin (porcine) 5000 UNIT/ML injection 5,000 Units, 5,000 Units, Subcutaneous, Q8H, Martha Beal MD  •  HYDROmorphone (DILAUDID) injection 0.5 mg, 0.5 mg, Intravenous, Q2H PRN, 0.5 mg at 20 1028 **AND** naloxone (NARCAN) injection 0.4 mg, 0.4 mg, Intravenous, Q5 Min PRN, Radha Fraser, APRN  •  ibuprofen (ADVIL,MOTRIN) tablet 600 mg, 600 mg, Oral, Q6H PRN, Martha Beal MD  •   "ondansetron (ZOFRAN) tablet 4 mg, 4 mg, Oral, Q6H PRN **OR** ondansetron (ZOFRAN) injection 4 mg, 4 mg, Intravenous, Q6H PRN, Radha Fraser APRN  •  piperacillin-tazobactam (ZOSYN) 3.375 g in iso-osmotic dextrose 50 ml (premix), 3.375 g, Intravenous, Q8H, Josep Rao, [facility], 3.375 g at 07/19/20 0812  •  sodium chloride 0.9 % flush 10 mL, 10 mL, Intravenous, Q12H, Radha Fraser APRN  •  sodium chloride 0.9 % flush 10 mL, 10 mL, Intravenous, PRN, Radha Fraser APRN  •  traMADol (ULTRAM) tablet 50 mg, 50 mg, Oral, Q6H PRN, Radha Fraser APRN    Allergies   Allergen Reactions   • Oxycodone GI Intolerance       Review of Systems      Objective   /73 (BP Location: Left arm, Patient Position: Lying)   Pulse 95   Temp 100.5 °F (38.1 °C) (Oral)   Resp 18   Ht 167.6 cm (66\")   Wt 86.2 kg (190 lb)   LMP 07/03/2020   SpO2 98%   BMI 30.67 kg/m²   General: well developed; well nourished  no acute distress   Heart: Not performed.   Lungs: breathing is unlabored   Abdomen: soft, non-tender; no masses  no umbilical or inguinal hernias are present  no hepato-splenomegaly   Pelvic:  Right labia majora with a 3 mm defect draining serosanguinous fluid.  Erythema and edema of the labia, not extending to the mons nor the perineal body.   Right inguinal adenopathy, tender.        Labs  CBC:   Lab Results   Component Value Date     07/19/2020    HGB 11.5 (L) 07/19/2020    HCT 35.0 07/19/2020    WBC 11.65 (H) 07/19/2020     CBC w/ diff:   Lab Results   Component Value Date     07/19/2020    HGB 11.5 (L) 07/19/2020    HCT 35.0 07/19/2020    MCV 94.3 07/19/2020    RDW 12.2 (L) 07/19/2020    WBC 11.65 (H) 07/19/2020    NEUTRORELPCT 82.1 (H) 07/19/2020    AUTOIGPER 0.5 07/19/2020    LYMPHORELPCT 11.2 (L) 07/19/2020    MONORELPCT 5.7 07/19/2020    EOSRELPCT 0.3 07/19/2020    BASORELPCT 0.2 07/19/2020     CMP:   Lab Results   Component Value Date     " 07/19/2020    K 3.6 07/19/2020     (H) 07/19/2020    CO2 22.0 07/19/2020    BUN 5 (L) 07/19/2020    CREATININE 0.56 (L) 07/19/2020    GLUCOSE 86 07/19/2020    ALBUMIN 4.10 07/18/2020    CALCIUM 7.3 (L) 07/19/2020    AST 15 07/18/2020    ALT 12 07/18/2020    BILITOT 0.5 07/18/2020     Blood and wound cultures pending.            Assessment   1. Abscess of the right labia majora s/p I&D in patient with history of hydradenitis suppurativa and without history of diabetes mellitus. No evidence of spread outside of right labia.       Plan   1. Continue broad spectrum antibiotics per ID recommendations.  Serial labs and examinations.  Treatment goal of outpatient management and consultation with her dermatologist for ongoing management of HS discussed with patient.  Consider additional surgical intervention as clinically indicated. Questions answered.       Delmi Lynn MD  7/19/2020  10:36

## 2020-07-19 NOTE — PROGRESS NOTES
[facility] Medicine Services  PROGRESS NOTE    Patient Name: Margarita Adair  : 1980  MRN: [id number]    Date of Admission: 2020  Primary Care Physician: Nadira Pritchard APRN    Subjective   Subjective     CC:  Sepsis    HPI:  Patient is a 39-year-old who presented to the emergency department with a worsening abscess in her labia.  She was admitted with sepsis (criteria including fever 102, tachycardia with a heart rate 112, leukocytosis with white blood cell count 21.05).  The abscess was drained and packed in the emergency room last night.  Initial culture swab was obtained but apparently not found in the lab.  Repeat culture was obtained by infectious disease this morning.  She is feeling some better this morning.  She continues on Zosyn IV.  Gynecology is consulted and plans to see her today as well.  She reports she uses a electric razor for trimming groin hair.  She has had previous episodes of groin abscesses and feels that recently swimming and sitting in a wet bathing suit may have exacerbated the current abscess.  She requests stool softener for constipation.  She requests Motrin in addition to Tylenol for pain.    Review of Systems  General: Positive fever, chills, fatigue  ENT: No sore throat, trouble swallowing or changes in vision  Respiratory: No shortness of breath, cough, wheezing or fast breathing  Cardiovascular: No chest pain, palpitations, dyspnea with exertion  Gastrointestinal: No nausea vomiting abdominal pain  Musculoskeletal: No difficulty walking, no weakness  Vascular: No cyanosis or clubbing  Lymphatic: No peripheral edema, no lymphadenopathy  Neurologic: No headache, confusion, dizziness  Psychiatric: No changes in mood.  No depression or anxiety.  Genital: Pain at the abscess site       Objective   Objective     Vital Signs:   Temp:  [98.8 °F (37.1 °C)-102.1 °F (38.9 °C)] 100.5 °F (38.1 °C)  Heart Rate:  [] 95  Resp:  [17-20] 18  BP:  ()/(56-97) 109/73        Physical Exam:  Constitutional: No acute distress, awake, alert, nontoxic, normal body habitus  Eyes: Pupils equal, sclerae anicteric, no conjunctival injection  HENT: NCAT, mucous membranes moist  Neck: Supple, no thyromegaly, no lymphadenopathy  Respiratory: Clear to auscultation bilaterally, good effort, nonlabored respirations   Cardiovascular: RRR  Gastrointestinal: Positive bowel sounds, soft, nontender, nondistended  Musculoskeletal: No peripheral edema, normal muscle tone for age  Psychiatric: Appropriate affect, good insight and judgement, cooperative  Neurologic: Oriented x 3, movements symmetric BUE and BLE, Cranial Nerves grossly intact, speech clear and fluent  Skin: Abscess with packing labia      Results Reviewed:  Results from last 7 days   Lab Units 07/19/20  0651 07/18/20  1559   WBC 10*3/mm3 11.65* 21.05*   HEMOGLOBIN g/dL 11.5* 13.0   HEMATOCRIT % 35.0 37.7   PLATELETS 10*3/mm3 227 298   PROCALCITONIN ng/mL  --  0.07     Results from last 7 days   Lab Units 07/19/20  0651 07/18/20  1559   SODIUM mmol/L 137 134*   POTASSIUM mmol/L 3.6 3.8   CHLORIDE mmol/L 109* 102   CO2 mmol/L 22.0 22.0   BUN mg/dL 5* 11   CREATININE mg/dL 0.56* 0.65   GLUCOSE mg/dL 86 89   CALCIUM mg/dL 7.3* 8.8   ALT (SGPT) U/L  --  12   AST (SGOT) U/L  --  15     Estimated Creatinine Clearance: 152.2 mL/min (A) (by C-G formula based on SCr of 0.56 mg/dL (L)).    Microbiology Results Abnormal     Procedure Component Value - Date/Time    Wound Culture - Wound, Groin [[id number]] Collected:  07/19/20 0847    Lab Status:  Preliminary result Specimen:  Wound from Groin Updated:  07/19/20 0925     Gram Stain Rare (1+) WBCs seen      No organisms seen          Imaging Results (Last 24 Hours)     Procedure Component Value Units Date/Time    XR Chest 1 View [[id number]] Collected:  07/18/20 1557     Updated:  07/18/20 1826    Narrative:          EXAMINATION: XR CHEST 1 VW - 07/18/2020     INDICATION: Fever        COMPARISON: NONE     FINDINGS: Portable chest reveals cardiac and mediastinal silhouettes  within normal limits. The lung fields are clear. No focal parenchymal  opacification is present. No pleural effusion or pneumothorax. The bony  structures are unremarkable. The pulmonary vascularity is within normal  limits.         Impression:       No acute cardiopulmonary disease.     DICTATED:   07/18/2020  EDITED/ls :   07/18/2020                      I have reviewed the medications:  Scheduled Meds:    DAPTOmycin 4 mg/kg (Adjusted) Intravenous Q24H   heparin (porcine) 5,000 Units Subcutaneous Q8H   piperacillin-tazobactam 3.375 g Intravenous Q8H   sodium chloride 10 mL Intravenous Q12H     Continuous Infusions:   PRN Meds:.•  acetaminophen  •  docusate sodium  •  HYDROmorphone **AND** naloxone  •  ibuprofen  •  ondansetron **OR** ondansetron  •  sodium chloride  •  traMADol    Assessment/Plan   Assessment & Plan     Active Hospital Problems    Diagnosis  POA   • **Sepsis (CMS/HCC) [A41.9]  Yes   • Fever [R50.9]  Yes   • Leukocytosis [D72.829]  Yes   • Tachycardia [R00.0]  Yes   • PCOS (polycystic ovarian syndrome) [E28.2]  Unknown   • Labial abscess [N76.4]  Unknown   • Overweight [E66.3]  Unknown      Resolved Hospital Problems   No resolved problems to display.        Brief Hospital Course to date:  Margarita Adair is a 39 y.o. female admitted with sepsis secondary to labial abscess.  Infectious disease and gynecology are consulted and following.    Acute sepsis secondary to labial abscess  -Clinically improving  -Currently on daptomycin and Zosyn per infectious disease  -Gynecology is consulted and will follow  -Culture sent on 7/19/2020    Fever  -Continue Tylenol Motrin as needed    Leukocytosis  -Clinically improving  -White blood cell count initially 21.05, improved to 11.65    Tachycardia  -Improving with IV fluids and IV antibiotics  -Discontinued IV fluids on 7/19/2020    Overweight    History of  PCOS      DVT Prophylaxis: Heparin    Disposition: I expect the patient to be discharged pending duration of her IV antibiotics, transition to oral antibiotics (per infectious disease), evaluation by gynecology with continued wound packing.    CODE STATUS:   Code Status and Medical Interventions:   Ordered at: 07/18/20 4547     Level Of Support Discussed With:    Patient     Code Status:    CPR     Medical Interventions (Level of Support Prior to Arrest):    Full         Electronically signed by Martha Beal MD, 07/19/20, 10:35.

## 2020-07-19 NOTE — CONSULTS
INFECTIOUS DISEASE CONSULT/INITIAL HOSPITAL VISIT    Margarita Adair  1980  [id number]    Date of Consult: 7/19/2020    Admission Date: 7/18/2020      Requesting Provider: Radha ARMENDARIZ  Evaluating Physician: Dr. Fredrick Mccloud    Reason for Consultation: Sepsis/Bartholin's gland abscess    Chief complaint: Right labial abscess with associated nausea/vomiting, fever    History of present illness:    Ms. Margarita Aragon is a 39 y.o. female is being evaluated for sepsis and right labial abscess.  She has a past medical history of hidradenitis and has followed with [facility] in past; polycystic ovarian syndrome, fatty liver, obesity and remote tobacco abuse (quit in 2006).  She presented to the ED on 7/18 with complaints of right labial lesion that was approximately pea-sized that originally appeared this past Friday.  Pain progressively worsened on Saturday and lesion progressed to approximately grape sized.  Due to her pain she presented to the ED for further evaluation and treatment on the morning of 7/18.  Lesion was lanced and patient was discharged home on Bactrim DS.  Later that afternoon/evening the patient spiked a fever of 102 as well as nausea and vomiting with worsening right groin pain.  She presented back to the ED where she was diagnosed with sepsis, blood cultures were collected and she was started on Vanco and Zosyn.  She is admitted for further evaluation and treatment.    Since admission, she has had T-max of 102.1 with some hypotension noted.  Most recent labs show WBC of 21.05 with a neutrophilia of 87.6%.  Procalcitonin was 0.07 and lactate was 0.7.  She had a lymphocyte percent of 4.4 with an absolute lymphocyte count of 0.92.  CMP was unremarkable.  UA was not impressive for UTI.  Blood cultures have been collected and are in progress.  Chest x-ray was collected and showed no acute cardiopulmonary process.    She has no listed antibiotic allergies and she received vancomycin and Zosyn  IV.  ID has been consulted for further evaluation and treatment as well as antimicrobial therapy management.    She has worked as an OR circulator    Of note: Patient had a known COVID-19 exposure on .  She did test negative on  and has remained at work checking her temperature daily for 2 weeks.  She did not have any  respiratory symptoms, loss of taste or smell.    Pain at labia has been constant, nonrad, worse with pressure, better since admit and with abx, 3-5 / 10 at present and not progressive;  nonradiating    No HA/photophobia or neck stiffness; no rash;  No dysuria/hematuria or pyuria/ no abd pain;  No N/V/D and no SOB/cough    She has been getting itching/flushed with vancomycin infusions    No other blunt force or penetrating trauma;  No fresh/brackish or salt water exposure; No raw or undercooked food.  No unpasteurized milk or milk products.  No animal insect or arthropod exposure.  No tick bites.  No outdoor camping or hunting exposure.  No travel exposure.  No ill exposure.  No history of TB or TB exposure.   Denies a history of MRSA/VRE and no history of C. difficile or ESBL/KPC  organisms.      Past Medical History:   Diagnosis Date   • Fatty liver    • Overweight    • PCOS (polycystic ovarian syndrome)        Past Surgical History:   Procedure Laterality Date   • ADENOIDECTOMY     • APPENDECTOMY     • CHOLECYSTECTOMY     • TONSILLECTOMY         Family History   Problem Relation Age of Onset   • Breast cancer Mother    • Hypertension Father    • No Known Problems Brother    • Autism Daughter    • ADD / ADHD Son        Social History     Socioeconomic History   • Marital status:      Spouse name: Not on file   • Number of children: Not on file   • Years of education: Not on file   • Highest education level: Not on file   Tobacco Use   • Smoking status: Former Smoker     Last attempt to quit:      Years since quittin.5   • Smokeless tobacco: Never Used   Substance and Sexual  Activity   • Alcohol use: Yes     Comment: reports she drinks a beer or glass of wine most evenings   • Drug use: Never   • Sexual activity: Defer     Comment:  and lives with         Allergies   Allergen Reactions   • Oxycodone GI Intolerance         Medication:    Current Facility-Administered Medications:   •  [START ON 7/20/2020] ! Vancomycin trough 7/20 @1630. Please hold 1700 dose until evaluated by pharmacy., , Does not apply, Once, Josep Rao, [facility]  •  acetaminophen (TYLENOL) tablet 650 mg, 650 mg, Oral, Q4H PRN, Radha Fraser, APRN, 650 mg at 07/18/20 2327  •  HYDROmorphone (DILAUDID) injection 0.5 mg, 0.5 mg, Intravenous, Q2H PRN, 0.5 mg at 07/18/20 2332 **AND** naloxone (NARCAN) injection 0.4 mg, 0.4 mg, Intravenous, Q5 Min PRN, Radha Fraser, APRN  •  ondansetron (ZOFRAN) tablet 4 mg, 4 mg, Oral, Q6H PRN **OR** ondansetron (ZOFRAN) injection 4 mg, 4 mg, Intravenous, Q6H PRN, Radha Fraser, APRRASHAWN  •  Pharmacy to dose vancomycin, , Does not apply, Continuous PRN, Radha Fraser, APRRASHAWN  •  piperacillin-tazobactam (ZOSYN) 3.375 g in iso-osmotic dextrose 50 ml (premix), 3.375 g, Intravenous, Q8H, Josep Rao, [facility], 3.375 g at 07/18/20 2328  •  sodium chloride 0.9 % flush 10 mL, 10 mL, Intravenous, Q12H, Radha Fraser, APRN  •  sodium chloride 0.9 % flush 10 mL, 10 mL, Intravenous, PRN, Radah Fraser, APRRASHAWN  •  sodium chloride 0.9 % infusion, 100 mL/hr, Intravenous, Continuous, Radha Fraser APRN, Last Rate: 100 mL/hr at 07/19/20 0509, 100 mL/hr at 07/19/20 0509  •  traMADol (ULTRAM) tablet 50 mg, 50 mg, Oral, Q6H PRN, Radha Fraser, MARCELLO  •  vancomycin 1250 mg/250 mL 0.9% NS IVPB ([facility]), 1,250 mg, Intravenous, Q12H, Josep Rao, [facility], 1,250 mg at 07/19/20 0509    Antibiotics:  Anti-Infectives (From admission, onward)    Ordered     Dose/Rate Route Frequency Start Stop    07/18/20  1907  vancomycin 1250 mg/250 mL 0.9% NS IVPB ([facility])  Review   Ordering Provider:  Josep Rao RP    1,250 mg  over 60 Minutes Intravenous Every 12 Hours 07/19/20 0500 07/26/20 0459    07/18/20 1900  piperacillin-tazobactam (ZOSYN) 3.375 g in iso-osmotic dextrose 50 ml (premix)  Review   Note to Pharmacy:  First dose was given in ER today.  Please time from that dose.   Ordering Provider:  Josep Rao [facility]    3.375 g  over 4 Hours Intravenous Every 8 Hours 07/19/20 0000 07/23/20 2359    07/18/20 1853  Pharmacy to dose vancomycin  Review   Ordering Provider:  Radha Fraser APRN     Does not apply Continuous PRN 07/18/20 1853 07/23/20 1852    07/18/20 1523  vancomycin 1750 mg/500 mL 0.9% NS IVPB ([facility])     Ordering Provider:  Golden Hedrick MD    20 mg/kg × 86.2 kg Intravenous Once 07/18/20 1525 07/18/20 1723    07/18/20 1523  piperacillin-tazobactam (ZOSYN) 3.375 g in iso-osmotic dextrose 50 ml (premix)     Ordering Provider:  Golden Hedrick MD    3.375 g Intravenous Once 07/18/20 1525 07/18/20 1723            Review of Systems:    Constitutional--positive for fevers trended down;  Appetite good, and no malaise. No fatigue.  HEENT-- No new vision, hearing or throat complaints.  No epistaxis or oral sores.  Denies odynophagia or dysphagia. No headache, photophobia or neck stiffness.  CV-- No chest pain, palpitation or syncope  Resp-- No SOB/cough/Hemoptysis  GI- No nausea, vomiting, or diarrhea.  No hematochezia, melena, or hematemesis. Denies jaundice or chronic liver disease.  --positive for right labial abscess.  Lymph- no swollen lymph nodes in neck/axilla or groin.   Heme- No active bruising or bleeding; no Hx of DVT or PE.  MS-- no swelling or pain in the bones or joints of arms/legs.  No new back pain.  Neuro-- No acute focal weakness or numbness in the arms or legs.  No seizures.  Skin--No rashes or lesions    All other systems negative for acute complaints on a 12 system  ROS      Physical Exam:   Vital Signs  Temp (24hrs), Av.4 °F (38 °C), Min:98.8 °F (37.1 °C), Max:102.1 °F (38.9 °C)    Temp  Min: 98.8 °F (37.1 °C)  Max: 102.1 °F (38.9 °C)  BP  Min: 90/57  Max: 116/97  Pulse  Min: 75  Max: 112  Resp  Min: 17  Max: 20  SpO2  Min: 93 %  Max: 99 %    GENERAL: Awake and alert, in no acute distress.   HEENT: Normocephalic, atraumatic.  PERRL. EOMI. No conjunctival injection. No icterus. Oropharynx clear without evidence of thrush or exudate. No evidence of peridontal disease.    NECK: Supple without nuchal rigidity. No mass.  LYMPH: No cervical, axillary lymphadenopathy.  There is some inguinal lymphadenopathy on the right side.  HEART: RRR; No murmur, rubs, gallops.   LUNGS: Clear to auscultation bilaterally without wheezing, rales, rhonchi. Normal respiratory effort. Nonlabored. No dullness.  ABDOMEN: Soft, nontender, nondistended. Positive bowel sounds. No rebound or guarding. NO mass or HSM.  EXT:  No cyanosis, clubbing or edema. No cord.  : Right labia is swollen 2-3+ edema with a small pinhole wound on the lower aspect of the labia.  The area is erythematous and is tender to touch.  Without Austin catheter. No crepitus or bullae; no fluctuance  MSK: FROM without joint effusions noted arms/legs.    SKIN: Warm and dry without cutaneous eruptions on Inspection/palpation.    NEURO: Oriented to PPT. No focal deficits on motor/sensory exam at arms/legs.  PSYCHIATRIC: Normal insight and judgement. Cooperative with PE    No peripheral stigmata/phenomena of IE    Laboratory Data    Results from last 7 days   Lab Units 20  1559   WBC 10*3/mm3 21.05*   HEMOGLOBIN g/dL 13.0   HEMATOCRIT % 37.7   PLATELETS 10*3/mm3 298     Results from last 7 days   Lab Units 20  1559   SODIUM mmol/L 134*   POTASSIUM mmol/L 3.8   CHLORIDE mmol/L 102   CO2 mmol/L 22.0   BUN mg/dL 11   CREATININE mg/dL 0.65   GLUCOSE mg/dL 89   CALCIUM mg/dL 8.8     Results from last 7 days   Lab Units  20  1559   ALK PHOS U/L 66   BILIRUBIN mg/dL 0.5   ALT (SGPT) U/L 12   AST (SGOT) U/L 15             Results from last 7 days   Lab Units 20  1559   LACTATE mmol/L 0.7             Estimated Creatinine Clearance: 131.1 mL/min (by C-G formula based on SCr of 0.65 mg/dL).      Microbiology:  Microbiology Results (last 10 days)     ** No results found for the last 240 hours. **              Radiology:  Imaging Results (Last 72 Hours)     Procedure Component Value Units Date/Time    XR Chest 1 View [[id number]] Collected:  20 1557     Updated:  20 182    Narrative:          EXAMINATION: XR CHEST 1 VW - 2020     INDICATION: Fever      COMPARISON: NONE     FINDINGS: Portable chest reveals cardiac and mediastinal silhouettes  within normal limits. The lung fields are clear. No focal parenchymal  opacification is present. No pleural effusion or pneumothorax. The bony  structures are unremarkable. The pulmonary vascularity is within normal  limits.         Impression:       No acute cardiopulmonary disease.     DICTATED:   2020  EDITED/ls :   2020                   Impression:   1. Acute Sepsis presenting with fever, leukocytosis, neutrophilia and labial abscess; sepsis parameters improving and no new focus of infection at present;  Monitor for other potential foci  2. Acute Right labial abscess- High risk for Gm+ organisms with Hx of hidradenitis  but keep broad coverage until further culture data; taper once more culture data  3. Polycystic ovarian syndrome; Hidradenitis  4. Red man syndrome- RN and patient reports that during the last 2 doses of vancomycin the patient has become erythematous, hot and felt like she is having an allergic reaction.  She was treated with Benadryl both times. Vancomycin stopped  5. Obesity  6. Remote history of tobacco abuse    PLAN/RECOMMENDATIONS:   Thank you for asking us to see Margarita Adair, I recommend the followin. daptomycin 4 mg/kg IV  daily  2. Zosyn IV  3. Continue to follow cultures and sensitivity reports and adjust antibiotics accordingly  4. Obtain abscess cultures-these will be antibiotic modified at this point but are still worth obtaining  5. Continue supportive care    --check/ review labs, cultures and scans  --Hx per nursing  --d/w micro  --highly complex set of issues with high risk for further serious morbidity and other serious sequelae  --she will need to go back to dermatology for management of hidradenitis  --d/w Dr Shane Mccloud has obtained the history, performed the physical exam and formulated the above treatment plan.     David Dorsey, APRN  7/19/2020  07:20

## 2020-07-19 NOTE — PLAN OF CARE
Problem: Patient Care Overview  Goal: Plan of Care Review  Flowsheets  Taken 7/19/2020 6832  Progress: no change  Outcome Summary: VSS, room air. Up ad isabel. Pain managed with PRNs. Vanc d/c'd and replaced with daptomycin due to pt becoming hot, flushed, itchy, and swelling on the vanc. Pt reports that the abcess is sore, but has more soreness and pain in her R groin. Pt accidentally removed packing that was placed in the ED this morning. After wound swabs were collected, re-packing was attempted but wound appears to not be deep enough. Now open to air. Will continue to monitor.  Taken 7/19/2020 0816  Plan of Care Reviewed With: patient

## 2020-07-19 NOTE — PLAN OF CARE
Problem: Patient Care Overview  Goal: Plan of Care Review  Outcome: Ongoing (interventions implemented as appropriate)  Flowsheets  Taken 7/19/2020 0314  Progress: no change  Outcome Summary: Pt admitted for a right labial abscess. I&D and packed in the ER. Ob/gyn consulted to see the patient as well as ID. Will continue to monitor.  Taken 7/18/2020 2000  Plan of Care Reviewed With: patient

## 2020-07-20 LAB
ALBUMIN SERPL-MCNC: 3.6 G/DL (ref 3.5–5.2)
ALBUMIN/GLOB SERPL: 1.2 G/DL
ALP SERPL-CCNC: 75 U/L (ref 39–117)
ALT SERPL W P-5'-P-CCNC: 35 U/L (ref 1–33)
ANION GAP SERPL CALCULATED.3IONS-SCNC: 10 MMOL/L (ref 5–15)
AST SERPL-CCNC: 35 U/L (ref 1–32)
BASOPHILS # BLD AUTO: 0.03 10*3/MM3 (ref 0–0.2)
BASOPHILS NFR BLD AUTO: 0.4 % (ref 0–1.5)
BILIRUB SERPL-MCNC: 0.4 MG/DL (ref 0–1.2)
BUN SERPL-MCNC: 6 MG/DL (ref 6–20)
BUN/CREAT SERPL: 10.3 (ref 7–25)
CALCIUM SPEC-SCNC: 8.7 MG/DL (ref 8.6–10.5)
CHLORIDE SERPL-SCNC: 107 MMOL/L (ref 98–107)
CO2 SERPL-SCNC: 21 MMOL/L (ref 22–29)
CREAT SERPL-MCNC: 0.58 MG/DL (ref 0.57–1)
CRP SERPL-MCNC: 3.89 MG/DL (ref 0–0.5)
DEPRECATED RDW RBC AUTO: 42.2 FL (ref 37–54)
EOSINOPHIL # BLD AUTO: 0.16 10*3/MM3 (ref 0–0.4)
EOSINOPHIL NFR BLD AUTO: 2.3 % (ref 0.3–6.2)
ERYTHROCYTE [DISTWIDTH] IN BLOOD BY AUTOMATED COUNT: 12.2 % (ref 12.3–15.4)
ERYTHROCYTE [SEDIMENTATION RATE] IN BLOOD: 17 MM/HR (ref 0–20)
GFR SERPL CREATININE-BSD FRML MDRD: 117 ML/MIN/1.73
GLOBULIN UR ELPH-MCNC: 3.1 GM/DL
GLUCOSE SERPL-MCNC: 169 MG/DL (ref 65–99)
HCT VFR BLD AUTO: 36.7 % (ref 34–46.6)
HGB BLD-MCNC: 12 G/DL (ref 12–15.9)
IMM GRANULOCYTES # BLD AUTO: 0.03 10*3/MM3 (ref 0–0.05)
IMM GRANULOCYTES NFR BLD AUTO: 0.4 % (ref 0–0.5)
LYMPHOCYTES # BLD AUTO: 2.11 10*3/MM3 (ref 0.7–3.1)
LYMPHOCYTES NFR BLD AUTO: 30.1 % (ref 19.6–45.3)
MCH RBC QN AUTO: 30.5 PG (ref 26.6–33)
MCHC RBC AUTO-ENTMCNC: 32.7 G/DL (ref 31.5–35.7)
MCV RBC AUTO: 93.4 FL (ref 79–97)
MONOCYTES # BLD AUTO: 0.56 10*3/MM3 (ref 0.1–0.9)
MONOCYTES NFR BLD AUTO: 8 % (ref 5–12)
NEUTROPHILS NFR BLD AUTO: 4.12 10*3/MM3 (ref 1.7–7)
NEUTROPHILS NFR BLD AUTO: 58.8 % (ref 42.7–76)
NRBC BLD AUTO-RTO: 0 /100 WBC (ref 0–0.2)
PLATELET # BLD AUTO: 270 10*3/MM3 (ref 140–450)
PMV BLD AUTO: 9.7 FL (ref 6–12)
POTASSIUM SERPL-SCNC: 3.8 MMOL/L (ref 3.5–5.2)
PROCALCITONIN SERPL-MCNC: 0.08 NG/ML (ref 0–0.25)
PROT SERPL-MCNC: 6.7 G/DL (ref 6–8.5)
RBC # BLD AUTO: 3.93 10*6/MM3 (ref 3.77–5.28)
SODIUM SERPL-SCNC: 138 MMOL/L (ref 136–145)
WBC # BLD AUTO: 7.01 10*3/MM3 (ref 3.4–10.8)

## 2020-07-20 PROCEDURE — 25010000002 HYDROMORPHONE PER 4 MG: Performed by: NURSE PRACTITIONER

## 2020-07-20 PROCEDURE — 84145 PROCALCITONIN (PCT): CPT | Performed by: NURSE PRACTITIONER

## 2020-07-20 PROCEDURE — 25010000002 DAPTOMYCIN PER 1 MG: Performed by: NURSE PRACTITIONER

## 2020-07-20 PROCEDURE — 25010000002 PIPERACILLIN SOD-TAZOBACTAM PER 1 G

## 2020-07-20 PROCEDURE — 80053 COMPREHEN METABOLIC PANEL: CPT | Performed by: NURSE PRACTITIONER

## 2020-07-20 PROCEDURE — 86140 C-REACTIVE PROTEIN: CPT | Performed by: NURSE PRACTITIONER

## 2020-07-20 PROCEDURE — 85652 RBC SED RATE AUTOMATED: CPT | Performed by: NURSE PRACTITIONER

## 2020-07-20 PROCEDURE — 25010000002 HEPARIN (PORCINE) PER 1000 UNITS: Performed by: FAMILY MEDICINE

## 2020-07-20 PROCEDURE — 85025 COMPLETE CBC W/AUTO DIFF WBC: CPT | Performed by: NURSE PRACTITIONER

## 2020-07-20 PROCEDURE — 99232 SBSQ HOSP IP/OBS MODERATE 35: CPT | Performed by: NURSE PRACTITIONER

## 2020-07-20 RX ADMIN — HEPARIN SODIUM 5000 UNITS: 5000 INJECTION INTRAVENOUS; SUBCUTANEOUS at 00:02

## 2020-07-20 RX ADMIN — HEPARIN SODIUM 5000 UNITS: 5000 INJECTION INTRAVENOUS; SUBCUTANEOUS at 09:14

## 2020-07-20 RX ADMIN — SODIUM CHLORIDE, PRESERVATIVE FREE 10 ML: 5 INJECTION INTRAVENOUS at 20:18

## 2020-07-20 RX ADMIN — SODIUM CHLORIDE 500 ML: 9 INJECTION, SOLUTION INTRAVENOUS at 01:22

## 2020-07-20 RX ADMIN — HEPARIN SODIUM 5000 UNITS: 5000 INJECTION INTRAVENOUS; SUBCUTANEOUS at 17:00

## 2020-07-20 RX ADMIN — TAZOBACTAM SODIUM AND PIPERACILLIN SODIUM 3.38 G: 375; 3 INJECTION, SOLUTION INTRAVENOUS at 17:01

## 2020-07-20 RX ADMIN — HYDROMORPHONE HYDROCHLORIDE 0.5 MG: 1 INJECTION, SOLUTION INTRAMUSCULAR; INTRAVENOUS; SUBCUTANEOUS at 23:09

## 2020-07-20 RX ADMIN — HEPARIN SODIUM 5000 UNITS: 5000 INJECTION INTRAVENOUS; SUBCUTANEOUS at 23:00

## 2020-07-20 RX ADMIN — TAZOBACTAM SODIUM AND PIPERACILLIN SODIUM 3.38 G: 375; 3 INJECTION, SOLUTION INTRAVENOUS at 08:36

## 2020-07-20 RX ADMIN — DAPTOMYCIN 300 MG: 500 INJECTION, POWDER, LYOPHILIZED, FOR SOLUTION INTRAVENOUS at 09:15

## 2020-07-20 RX ADMIN — TAZOBACTAM SODIUM AND PIPERACILLIN SODIUM 3.38 G: 375; 3 INJECTION, SOLUTION INTRAVENOUS at 23:00

## 2020-07-20 RX ADMIN — IBUPROFEN 600 MG: 600 TABLET, FILM COATED ORAL at 22:04

## 2020-07-20 NOTE — PROGRESS NOTES
[facility] Medicine Services  PROGRESS NOTE    Patient Name: Margarita Adair  : 1980  MRN: [id number]    Date of Admission: 2020  Primary Care Physician: Nadira Pritchard APRN    Subjective   Subjective     CC:  Sepsis    HPI:  Patient is a 39-year-old who presented to the emergency department with a worsening abscess in her labia.  She was admitted with sepsis (criteria including fever 102, tachycardia with a heart rate 112, leukocytosis with white blood cell count 21.05).  The abscess was drained and packed in the emergency room last night.  Initial culture swab was obtained but apparently not found in the lab.  Repeat culture was obtained by infectious disease this morning.  She is feeling some better this morning.  She continues on Zosyn IV.  Gynecology is consulted and plans to see her today as well.  She reports she uses a electric razor for trimming groin hair.  She has had previous episodes of groin abscesses and feels that recently swimming and sitting in a wet bathing suit may have exacerbated the current abscess.  She requests stool softener for constipation.  She requests Motrin in addition to Tylenol for pain.    2020- Patient rested well last night. Had fever chills around 9pm, but nothing further. Swelling in labia and groin improving. Able to walk better. No intake or voiding issue    Review of Systems  Gen- + fevers, chills  CV- No chest pain, palpitations  Resp- No cough, dyspnea  GI- No N/V/D, abd pain  - swelling and pain in right groin/ labia- no drainage           Objective   Objective     Vital Signs:   Temp:  [98.1 °F (36.7 °C)-100 °F (37.8 °C)] 99.3 °F (37.4 °C)  Heart Rate:  [59-90] 86  Resp:  [16-18] 18  BP: ()/(40-75) 105/60        Physical Exam:  Constitutional: No acute distress, awake, alert  HENT: NCAT, mucous membranes moist  Respiratory: Clear to auscultation bilaterally, respiratory effort normal   Cardiovascular: RRR, no  murmurs, rubs, or gallops, palpable pedal pulses bilaterally  Gastrointestinal: Positive bowel sounds, soft, nontender, nondistended  Musculoskeletal: No bilateral ankle edema  Psychiatric: Appropriate affect, cooperative  Neurologic: Oriented x 3, strength symmetric in all extremities, Cranial Nerves grossly intact to confrontation, speech clear  Skin: right labia with mild erythema and swelling- pinpoint pustule on labia. Noted mild right groin swelling/ lymph nodes        Results Reviewed:  Results from last 7 days   Lab Units 07/20/20  0832 07/19/20  0651 07/18/20  1559   WBC 10*3/mm3 7.01 11.65* 21.05*   HEMOGLOBIN g/dL 12.0 11.5* 13.0   HEMATOCRIT % 36.7 35.0 37.7   PLATELETS 10*3/mm3 270 227 298   PROCALCITONIN ng/mL 0.08  --  0.07     Results from last 7 days   Lab Units 07/20/20  0832 07/19/20  0651 07/18/20  1559   SODIUM mmol/L 138 137 134*   POTASSIUM mmol/L 3.8 3.6 3.8   CHLORIDE mmol/L 107 109* 102   CO2 mmol/L 21.0* 22.0 22.0   BUN mg/dL 6 5* 11   CREATININE mg/dL 0.58 0.56* 0.65   GLUCOSE mg/dL 169* 86 89   CALCIUM mg/dL 8.7 7.3* 8.8   ALT (SGPT) U/L 35*  --  12   AST (SGOT) U/L 35*  --  15     Estimated Creatinine Clearance: 147 mL/min (by C-G formula based on SCr of 0.58 mg/dL).    Microbiology Results Abnormal     Procedure Component Value - Date/Time    Wound Culture - Wound, Groin [[id number]]  (Abnormal) Collected:  07/19/20 0847    Lab Status:  Preliminary result Specimen:  Wound from Groin Updated:  07/20/20 0827     Wound Culture Rare Bacillus species     Comment:   Most Bacillus species are susceptible to vancomycin, aminoglycosides, clindamycin, chloramphenicol, and erythromycin.        Gram Stain Rare (1+) WBCs seen      No organisms seen    Blood Culture - Blood, Arm, Right [[id number]] Collected:  07/18/20 1559    Lab Status:  Preliminary result Specimen:  Blood from Arm, Right Updated:  07/19/20 1630     Blood Culture No growth at 24 hours    Blood Culture - Blood, Arm, Right [[id number]]  Collected:  07/18/20 1605    Lab Status:  Preliminary result Specimen:  Blood from Arm, Right Updated:  07/19/20 1630     Blood Culture No growth at 24 hours          Imaging Results (Last 24 Hours)     Procedure Component Value Units Date/Time    XR Chest 1 View [[id number]] Collected:  07/18/20 1557     Updated:  07/19/20 1537    Narrative:          EXAMINATION: XR CHEST 1 VW - 07/18/2020     INDICATION: Fever      COMPARISON: NONE     FINDINGS: Portable chest reveals cardiac and mediastinal silhouettes  within normal limits. The lung fields are clear. No focal parenchymal  opacification is present. No pleural effusion or pneumothorax. The bony  structures are unremarkable. The pulmonary vascularity is within normal  limits.         Impression:       No acute cardiopulmonary disease.     DICTATED:   07/18/2020  EDITED/ls :   07/18/2020      This report was finalized on 7/19/2020 3:34 PM by Dr. Rocio Weiss MD.                  I have reviewed the medications:  Scheduled Meds:    DAPTOmycin 4 mg/kg (Adjusted) Intravenous Q24H   heparin (porcine) 5,000 Units Subcutaneous Q8H   piperacillin-tazobactam 3.375 g Intravenous Q8H   sodium chloride 10 mL Intravenous Q12H     Continuous Infusions:   PRN Meds:.•  acetaminophen  •  docusate sodium  •  HYDROmorphone **AND** naloxone  •  ibuprofen  •  ondansetron **OR** ondansetron  •  sodium chloride  •  traMADol    Assessment/Plan   Assessment & Plan     Active Hospital Problems    Diagnosis  POA   • **Sepsis (CMS/HCC) [A41.9]  Yes   • Fever [R50.9]  Yes   • Leukocytosis [D72.829]  Yes   • Tachycardia [R00.0]  Yes   • PCOS (polycystic ovarian syndrome) [E28.2]  Unknown   • Labial abscess [N76.4]  Unknown   • Overweight [E66.3]  Unknown      Resolved Hospital Problems   No resolved problems to display.        Brief Hospital Course to date:  Margarita Adair is a 39 y.o. female admitted with sepsis secondary to labial abscess.  Infectious disease and gynecology are  consulted and following.    Acute sepsis secondary to labial abscess  -Clinically improving. WBC WNL  -Currently on daptomycin and Zosyn per infectious disease- watch cultures overnight for further abx management. Wound cx rare bacillus   -Gynecology is consulted and will follow    Fever  -Continue Tylenol Motrin as needed    Leukocytosis  -resolved  -White blood cell count initially 21.05, now 7    Tachycardia  -Improving with IV fluids and IV antibiotics  -Discontinued IV fluids on 7/19/2020- continue to monitor and encourage oral intake    Overweight    History of PCOS      DVT Prophylaxis: Heparin    Disposition: I expect the patient to be discharged pending duration of her IV antibiotics, transition to oral antibiotics (per infectious disease), evaluation by gynecology - ~ 1-2 days    CODE STATUS:   Code Status and Medical Interventions:   Ordered at: 07/18/20 1846     Level Of Support Discussed With:    Patient     Code Status:    CPR     Medical Interventions (Level of Support Prior to Arrest):    Full         Electronically signed by MARCELLO Dan, 07/20/20, 10:33.

## 2020-07-20 NOTE — PROGRESS NOTES
Daily Progress Note    Patient name: Margarita Adair  YOB: 1980   MRN: [id number]  Referring Provider: Delano Ma  Admission Date: 7/18/2020  Date of Service: 7/20/2020    Margarita Adair is a 39 y.o.    at Unknown  admitted on 7/18/2020 for Sepsis (CMS/HCC) from abscess of the right labia majora s/p  I & D.    Hospital day 2    Diagnoses:   Patient Active Problem List   Diagnosis   • Sepsis (CMS/HCC)   • PCOS (polycystic ovarian syndrome)   • Labial abscess   • Overweight   • Fever   • Leukocytosis   • Tachycardia       Prenatal Labs  Lab Results   Component Value Date    HGB 12.0 07/20/2020       Subjective:      Margarita is feeling better today.  She states that the pain is exponentially decreased. She states the redness is decreasing. She is able to ambulate without discomfort. She is eating without difficulty.    Objective:     Vital signs:  Vital Signs Range for the last 24 hours  Temperature: Temp:  [98.1 °F (36.7 °C)-100 °F (37.8 °C)] 98.7 °F (37.1 °C)   Temp Source: Temp src: Oral   BP: BP: ()/(40-75) 99/47   Pulse: Heart Rate:  [59-90] 77   Respirations: Resp:  [16-18] 18   Weight: 86.2 kg (190 lb)     General: Alert & oriented x4, in no apparent distress  HEENT: Grossly normal  Resp: Unlabored breathing  Abdomen: Soft, nontender  Pelvis:  Right labia is not draining since they did cultures again yesterday.  Feels a little indurated but non-painful. Right inguinal area is now non-tender.   Extremities: Nontender, no pain, no edema   Neurologic:  Alert & oriented x 4,  no focal deficits noted  Psychiatric:  Speech and behavior appropriate     Medications:    DAPTOmycin 4 mg/kg (Adjusted) Intravenous Q24H   heparin (porcine) 5,000 Units Subcutaneous Q8H   piperacillin-tazobactam 3.375 g Intravenous Q8H   sodium chloride 10 mL Intravenous Q12H      •  acetaminophen  •  docusate sodium  •  HYDROmorphone **AND** naloxone  •  ibuprofen  •  ondansetron **OR** ondansetron  •  sodium  chloride  •  traMADol    Labs:  Lab Results   Component Value Date    WBC 7.01 07/20/2020    HGB 12.0 07/20/2020    HCT 36.7 07/20/2020    MCV 93.4 07/20/2020     07/20/2020    AST 35 (H) 07/20/2020    ALT 35 (H) 07/20/2020           Assessment/Plan:      Margarita is a 39 y.o.  With abscess of right labia majora s/p I & D with  Sepsis (CMS/[facility]), improving    Hospital Problem List     * (Principal) Sepsis (CMS/[facility])    PCOS (polycystic ovarian syndrome) (Chronic)    Labial abscess    Overweight (Chronic)        Fever    Leukocytosis    Tachycardia      Assessment:  1.  Abscess of the right labia majora s/p I&D in patient with history of hydradenitis suppurativa and without history of diabetes mellitus. No evidence of spread outside of right labia.  2.  Improving    Plan:  1.  Continue broad spectrum antibiotics per ID recommendations  2.  Patient states I & D are waiting for cultures to come back to see if they need to open the wound again or not.    3.  Continue recommendations from Infectious Disease    I spent 15 minutes with this patient of which greater than 50% was face to face counseling and coordination of care.  All questions were answered to the best of my ability.    Betty Crowder,   7/20/2020 11:58

## 2020-07-20 NOTE — PLAN OF CARE
Problem: Patient Care Overview  Goal: Plan of Care Review  Flowsheets  Taken 7/20/2020 1550  Progress: improving  Outcome Summary: VSS, room air. Abcess sight has nearly closed up now. Pt still remains hypotensive, but asymptomatic. Will continue to monitor.  Taken 7/20/2020 0800  Plan of Care Reviewed With: patient

## 2020-07-20 NOTE — PROGRESS NOTES
[facility] Progress Note        Antibiotics:  Anti-Infectives (From admission, onward)    Ordered     Dose/Rate Route Frequency Start Stop    07/19/20 0901  DAPTOmycin (CUBICIN) 300 mg in sodium chloride 0.9 % 50 mL IVPB  Review   Ordering Provider:  David Dorsey APRN    4 mg/kg × 70.1 kg (Adjusted)  100 mL/hr over 30 Minutes Intravenous Every 24 Hours 07/19/20 1000 07/26/20 0959    07/18/20 1900  piperacillin-tazobactam (ZOSYN) 3.375 g in iso-osmotic dextrose 50 ml (premix)  Review   Note to Pharmacy:  First dose was given in ER today.  Please time from that dose.   Ordering Provider:  Josep Rao RP    3.375 g  over 4 Hours Intravenous Every 8 Hours 07/19/20 0000 07/23/20 2359    07/18/20 1523  vancomycin 1750 mg/500 mL 0.9% NS IVPB ([facility])     Ordering Provider:  Golden Hedrick MD    20 mg/kg × 86.2 kg Intravenous Once 07/18/20 1525 07/18/20 1723    07/18/20 1523  piperacillin-tazobactam (ZOSYN) 3.375 g in iso-osmotic dextrose 50 ml (premix)     Ordering Provider:  Golden Hedrick MD    3.375 g Intravenous Once 07/18/20 1525 07/18/20 1723          CC: labial infection    HPI:    Ms. Margarita Aragon is a 39 y.o. female is being evaluated for sepsis and right labial abscess.  She has a past medical history of hidradenitis and has followed with [facility] in past; polycystic ovarian syndrome, fatty liver, obesity and remote tobacco abuse (quit in 2006).  She presented to the ED on 7/18 with complaints of right labial lesion that was approximately pea-sized that originally appeared this past Friday.  Pain progressively worsened on Saturday and lesion progressed to approximately grape sized.  Due to her pain she presented to the ED for further evaluation and treatment on the morning of 7/18.  Lesion was lanced and patient was discharged home on Bactrim DS.  Later that afternoon/evening the patient spiked a fever of 102 as well as nausea and vomiting with worsening right groin pain.  She presented back to the ED  where she was diagnosed with sepsis, blood cultures were collected and she was started on Vanco and Zosyn.  She is admitted for further evaluation and treatment.     Since admission, she has had T-max of 102.1 with some hypotension noted.  Most recent labs show WBC of 21.05 with a neutrophilia of 87.6%.  Procalcitonin was 0.07 and lactate was 0.7.  She had a lymphocyte percent of 4.4 with an absolute lymphocyte count of 0.92.  CMP was unremarkable.  UA was not impressive for UTI.  Blood cultures have been collected and are in progress.  Chest x-ray was collected and showed no acute cardiopulmonary process.   She has worked as an OR circulator      7/20/20 Pain at labia has been less intense, nonrad, worse with pressure, better since admit and with abx, 3 / 10 at present and not progressive;  nonradiating     No HA/photophobia or neck stiffness; no rash;  No dysuria/hematuria or pyuria/ no abd pain;  No N/V/D and no SOB/cough     She got itching/flushed with vancomycin infusions      ROS:      7/20/20 No f/c/s. No n/v/d. No rash. No new ADR to Abx.     Constitutional--  fevers trended down;  Appetite good, and no malaise. No fatigue.  HEENT-- No new vision, hearing or throat complaints.  No epistaxis or oral sores.  Denies odynophagia or dysphagia. No headache, photophobia or neck stiffness.  CV-- No chest pain, palpitation or syncope  Resp-- No SOB/cough/Hemoptysis  GI- No nausea, vomiting, or diarrhea.  No hematochezia, melena, or hematemesis. Denies jaundice or chronic liver disease.  --positive for right labial abscess.  Lymph- no swollen lymph nodes in neck/axilla or groin.   Heme- No active bruising or bleeding; no Hx of DVT or PE.  MS-- no swelling or pain in the bones or joints of arms/legs.  No new back pain.  Neuro-- No acute focal weakness or numbness in the arms or legs.  No seizures.  Skin--No rashes or lesions     All other systems negative for acute complaints on a 12 system ROS       PE:   /65  "(BP Location: Right arm, Patient Position: Lying)   Pulse 77   Temp 98.1 °F (36.7 °C) (Oral)   Resp 18   Ht 167.6 cm (66\")   Wt 86.2 kg (190 lb)   LMP 07/03/2020   SpO2 96%   BMI 30.67 kg/m²     GENERAL: Awake and alert, in no acute distress.   HEENT: Normocephalic, atraumatic.  PERRL. EOMI. No conjunctival injection. No icterus. Oropharynx clear without evidence of thrush or exudate. No evidence of peridontal disease.    NECK: Supple without nuchal rigidity. No mass.  LYMPH: No cervical, axillary lymphadenopathy.  There is some inguinal lymphadenopathy on the right side.  HEART: RRR; No murmur, rubs, gallops.   LUNGS: Clear to auscultation bilaterally without wheezing, rales, rhonchi. Normal respiratory effort. Nonlabored. No dullness.  ABDOMEN: Soft, nontender, nondistended. Positive bowel sounds. No rebound or guarding. NO mass or HSM.  EXT:  No cyanosis, clubbing or edema. No cord.  : Right labia is swollen 2-3+ edema with a small pinhole wound on the lower aspect of the labia.  The area is erythematous and is tender to touch.  Without Austin catheter. No crepitus or bullae; no fluctuance  MSK: FROM without joint effusions noted arms/legs.    SKIN: Warm and dry without cutaneous eruptions on Inspection/palpation.    NEURO: Oriented to PPT. No focal deficits on motor/sensory exam at arms/legs.  PSYCHIATRIC: Normal insight and judgement. Cooperative with PE     No peripheral stigmata/phenomena of IE      Laboratory Data    Results from last 7 days   Lab Units 07/20/20  0832 07/19/20  0651 07/18/20  1559   WBC 10*3/mm3 7.01 11.65* 21.05*   HEMOGLOBIN g/dL 12.0 11.5* 13.0   HEMATOCRIT % 36.7 35.0 37.7   PLATELETS 10*3/mm3 270 227 298     Results from last 7 days   Lab Units 07/20/20  0832   SODIUM mmol/L 138   POTASSIUM mmol/L 3.8   CHLORIDE mmol/L 107   CO2 mmol/L 21.0*   BUN mg/dL 6   CREATININE mg/dL 0.58   GLUCOSE mg/dL 169*   CALCIUM mg/dL 8.7     Results from last 7 days   Lab Units 07/20/20  0832 "   ALK PHOS U/L 75   BILIRUBIN mg/dL 0.4   ALT (SGPT) U/L 35*   AST (SGOT) U/L 35*     Results from last 7 days   Lab Units 07/20/20  0832   SED RATE mm/hr 17     Results from last 7 days   Lab Units 07/20/20  0832   CRP mg/dL 3.89*       Estimated Creatinine Clearance: 147 mL/min (by C-G formula based on SCr of 0.58 mg/dL).      Microbiology:      Radiology:  Imaging Results (Last 72 Hours)     Procedure Component Value Units Date/Time    XR Chest 1 View [[id number]] Collected:  07/18/20 1557     Updated:  07/19/20 1537    Narrative:          EXAMINATION: XR CHEST 1 VW - 07/18/2020     INDICATION: Fever      COMPARISON: NONE     FINDINGS: Portable chest reveals cardiac and mediastinal silhouettes  within normal limits. The lung fields are clear. No focal parenchymal  opacification is present. No pleural effusion or pneumothorax. The bony  structures are unremarkable. The pulmonary vascularity is within normal  limits.         Impression:       No acute cardiopulmonary disease.     DICTATED:   07/18/2020  EDITED/ls :   07/18/2020      This report was finalized on 7/19/2020 3:34 PM by Dr. Rocio Weiss MD.               Impression:     1. Acute Sepsis presenting with fever, leukocytosis, neutrophilia and labial abscess; sepsis parameters improving and no new focus of infection at present;  Monitor for other potential foci  2. Acute Right labial abscess- High risk for Gm+ organisms with Hx of hidradenitis  but keep broad coverage until further culture data; taper once more culture data  3. Polycystic ovarian syndrome; Hidradenitis  4. Red man syndrome- RN and patient reports that during the last 2 doses of vancomycin the patient has become erythematous, hot and felt like she is having an allergic reaction.  She was treated with Benadryl both times. Vancomycin stopped  5. Obesity  6. Remote history of tobacco abuse    PLAN:  1.  daptomycin 4 mg/kg IV daily  2. Zosyn IV  3. Continue to follow cultures and  sensitivity reports and adjust antibiotics accordingly  4. Obtain abscess cultures-these will be antibiotic modified at this point but are still worth obtaining  5. Continue supportive care     --check/ review labs, cultures and scans  --Hx per nursing  --d/w micro  --highly complex set of issues with high risk for further serious morbidity and other serious sequelae  --she will need to go back to dermatology for management of hidradenitis  --d/w Dr Campuzano    **labile BP overnight although feels better overall and  with less erythema and no new sequelae otherwise;  Cx with bacillus sp which likely to be skin contaminant/colonizer rather than pathogen         Fredrick Mccloud MD  7/20/2020

## 2020-07-20 NOTE — PROGRESS NOTES
Discharge Planning Assessment  Psychiatric     Patient Name: Margarita Adair  MRN: [id number]  Today's Date: 7/20/2020    Admit Date: 7/18/2020    Discharge Needs Assessment     Row Name 07/20/20 1150       Living Environment    Lives With  spouse;child(marcie), dependent Pt resides in [facility]    Name(s) of Who Lives With Patient  - Berto and 2 children ages 7 and 4    Current Living Arrangements  home/apartment/condo    Primary Care Provided by  self    Provides Primary Care For  child(marcie)    Family Caregiver if Needed  spouse    Family Caregiver Names  Berto    Quality of Family Relationships  helpful;involved;supportive    Able to Return to Prior Arrangements  yes       Transition Planning    Patient/Family Anticipates Transition to  home with family    Patient/Family Anticipated Services at Transition  none    Transportation Anticipated  family or friend will provide       Discharge Needs Assessment    Readmission Within the Last 30 Days  no previous admission in last 30 days    Concerns to be Addressed  denies needs/concerns at this time;discharge planning    Equipment Currently Used at Home  none    Anticipated Changes Related to Illness  none    Equipment Needed After Discharge  none    Provided Post Acute Provider List?  N/A        Discharge Plan     Row Name 07/20/20 9513       Plan    Plan  home    Provided Post Acute Provider Quality & Resource List?  N/A    Patient/Family in Agreement with Plan  yes    Plan Comments  CM spoke with pt at bedside. Pt resides in [facility] with her  Berto and 2 children ages 7 and 4. Pt is independent of adls and denies use of DME. Pt is not current with home health or outpatient services. Pt plans home and denies needs at this time. CM will continue to follow.     Final Discharge Disposition Code  01 - home or self-care        Destination      Coordination has not been started for this encounter.      Durable Medical Equipment      Coordination has  not been started for this encounter.      Dialysis/Infusion      Coordination has not been started for this encounter.      Home Medical Care      Coordination has not been started for this encounter.      Therapy      Coordination has not been started for this encounter.      Community Resources      Coordination has not been started for this encounter.          Demographic Summary     Row Name 07/20/20 1155       General Information    Referral Source  admission list    Reason for Consult  discharge planning    Preferred Language  English    General Information Comments  PCP- MARCELLO Tomas       Contact Information    Permission Granted to Share Info With          Functional Status     Row Name 07/20/20 1159       Functional Status    Usual Activity Tolerance  good    Current Activity Tolerance  moderate       Functional Status, IADL    Medications  independent    Meal Preparation  independent    Housekeeping  independent    Laundry  independent    Shopping  independent       Employment/    Employment/ Comments  Pt confirms she has [employer] and denies concerns or disruption in coverage. Pt has prescription drug coverage and denies issues obtaining or affording current medications.         Psychosocial    No documentation.       Abuse/Neglect    No documentation.       Legal    No documentation.       Substance Abuse    No documentation.       Patient Forms    No documentation.           Delmi Li RN

## 2020-07-20 NOTE — PLAN OF CARE
Problem: Patient Care Overview  Goal: Plan of Care Review  7/20/2020 0419 by Cindy Linares, RN  Outcome: Ongoing (interventions implemented as appropriate)  Flowsheets (Taken 7/19/2020 0265 by Lucina Ayala, RN)  Outcome Summary: VSS, room air. Up ad isabel. Pain managed with PRNs. Vanc d/c'd and replaced with daptomycin due to pt becoming hot, flushed, itchy, and swelling on the vanc. Pt reports that the abcess is sore, but has more soreness and pain in her R groin. Pt accidentally removed packing that was placed in the ED this morning. After wound swabs were collected, re-packing was attempted but wound appears to not be deep enough. Now open to air. Will continue to monitor.   PT IS S/P I&D OF RIGHT LABIA RT ABSCESS.  OBSERVATION OF AREA SHOWS HEALING AND NO AREA TO PACK.  AREA IS PIN POINT.  PT CONT TO HAVE SWELLING IN AREA.  WILL CONT TO MONITOR

## 2020-07-20 NOTE — PAYOR COMM NOTE
"Ref # [id number]  Margarita Zapata RN, BSN  Phone # [phone]  Fax # [phone]  Margarita Quiroga (39 y.o. Female)     Date of Birth Social Security Number Address Home Phone MRN    1980  [facility]   [place] [address] [phone] [id number]    Hindu Marital Status          Shinto        Admission Date Admission Type Admitting Provider Attending Provider Department, Room/Bed    20 Emergency Elena Campuzano MD Hall, Holly, MD [facility] 2F, S215/    Discharge Date Discharge Disposition Discharge Destination                       Attending Provider:  Elena Campuzano MD    Allergies:  Oxycodone    Isolation:  None   Infection:  None   Code Status:  CPR    Ht:  167.6 cm (66\")   Wt:  86.2 kg (190 lb)    Admission Cmt:  None   Principal Problem:  Sepsis (CMS/HCC) [A41.9]                 Active Insurance as of 2020     Primary Coverage     Payor Plan Insurance Group Employer/Plan Group    [employer] [facility] [employer] [facility] [id number] [id number]     Payor Plan Address Payor Plan Phone Number Payor Plan Fax Number Effective Dates    [address] [phone]  2020 - None Entered    [place] [address]       Subscriber Name Subscriber Birth Date Member ID       SHELLY QUIROGA 1977 [id number]                      History & Physical      Delano Ma MD at 20 1815              [facility] Medicine Services  HISTORY AND PHYSICAL    Patient Name: Margarita Quiroga  : 1980  MRN: [id number]  Primary Care Physician: Nadira Pritchard APRN  Date of admission: 2020      Subjective   Subjective     Chief Complaint:  Rt labial abscess with associated n/v, fever     HPI:  Margarita Quiroga is a 39 y.o. female with pmh of PCOS, fatty liver, overweight and remote tobacco abuse (quit in ).  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  As a note, pt had a known COVID exposure on , " tested negative on 7/2 and has worked throughout without any fever or respiratory s/s, and no loss of taste or smell.  She was under daily monitoring of temp and symptoms until 2 days ago when she was release as her 14 days had passed.     Today pt presents to ER this am with cc of Rt labial lesion that was pea sized and appeared on Friday.  Pain worsened and yesterday the lesion was approx grape sized.  No fever, chills, n/v, cough, h/a, diarrhea.  Pain worsened and she was seen in our ER this am and dxd with Rt bartholin's gland abscess.  Lesion was lanced and pt was started on Bactrim DS and given percocet.  Dcd home.  Later today she developed fever of 102 and n/v with worsening rt groin pain.  She came back to ER.  Dxd with sepsis associated with abscess.  Blood cx pending. Started on Vanc/zosyn and IVFs. Will admit to hospitalist service and consult ID and gyn.      Review of Systems   Constitutional: Positive for activity change, chills and fever. Negative for fatigue.   HENT: Negative for congestion, sinus pressure, sinus pain, sore throat and trouble swallowing.         No loss of taste or smell    Eyes: Negative.    Respiratory: Negative for cough, chest tightness, shortness of breath and wheezing.    Cardiovascular: Negative.    Gastrointestinal: Positive for nausea and vomiting. Negative for abdominal pain and diarrhea.   Endocrine: Negative.    Genitourinary: Negative.    Musculoskeletal: Negative.    Skin: Positive for wound.        Rt labial abscess s/p I&D in ER this am with packing.  Very tender rt groin    Allergic/Immunologic: Negative.    Neurological: Negative.    Hematological: Negative.    Psychiatric/Behavioral: Negative.         All other systems reviewed and are negative.     Personal History     Past Medical History:   Diagnosis Date   • Fatty liver    • Overweight    • PCOS (polycystic ovarian syndrome)        Past Surgical History:   Procedure Laterality Date   • ADENOIDECTOMY     •  APPENDECTOMY     • CHOLECYSTECTOMY     • TONSILLECTOMY         Family History: family history includes ADD / ADHD in her son; Autism in her daughter; Breast cancer in her mother; Hypertension in her father; No Known Problems in her brother. Otherwise pertinent FHx was reviewed and unremarkable.     Social History:  reports that she quit smoking about 14 years ago. She has never used smokeless tobacco. She reports that she drinks alcohol. She reports that she does not use drugs.  Social History     Social History Narrative   • Not on file       Medications:  Available home medication information reviewed.  Medications Prior to Admission   Medication Sig Dispense Refill Last Dose   • spironolactone (ALDACTONE) 100 MG tablet 100 mg.   7/17/2020 at Unknown time       Allergies   Allergen Reactions   • Oxycodone GI Intolerance       Objective   Objective     Vital Signs:   Temp:  [97.7 °F (36.5 °C)-102.1 °F (38.9 °C)] 99.6 °F (37.6 °C)  Heart Rate:  [] 88  Resp:  [16-20] 18  BP: ()/(56-97) 102/56        Physical Exam   Constitutional: Awake, alert. Sitting up in bed in ER in NAD.  No visitors at bs.   Eyes: PERRLA, sclerae anicteric, no conjunctival injection  HENT: NCAT, mucous membranes moist  Neck: Supple, no thyromegaly, no lymphadenopathy, trachea midline  Respiratory: Clear to auscultation bilaterally A/P, nonlabored respirations on RA (note pt is wearing her own SN95 mask in ER on exam)  Cardiovascular: sinus tach, no murmurs, rubs, or gallops, palpable pedal pulses bilaterally  Gastrointestinal: Positive bowel sounds, soft, nontender, nondistended. Overweight  Musculoskeletal: No bilateral ankle edema, no clubbing or cyanosis to extremities. FERGUSON spontaneously   Psychiatric: Appropriate affect, cooperative and calm   Neurologic: Oriented x 3, strength symmetric in all extremities, Cranial Nerves grossly intact to confrontation, speech clear and appropriate.  Follows commands   Skin: Rt labia majora  with posterior area of mild erythema. Also noted area lanced and wick/packing in place.  VERY ttp to rt groin. Unable to assess adequately for lymphadenopathy due to significant pain.       Results Reviewed:  I have personally reviewed current lab and radiology data.    Results from last 7 days   Lab Units 07/18/20  1559   WBC 10*3/mm3 21.05*   HEMOGLOBIN g/dL 13.0   HEMATOCRIT % 37.7   PLATELETS 10*3/mm3 298     Results from last 7 days   Lab Units 07/18/20  1559   SODIUM mmol/L 134*   POTASSIUM mmol/L 3.8   CHLORIDE mmol/L 102   CO2 mmol/L 22.0   BUN mg/dL 11   CREATININE mg/dL 0.65   GLUCOSE mg/dL 89   CALCIUM mg/dL 8.8   ALT (SGPT) U/L 12   AST (SGOT) U/L 15   LACTATE mmol/L 0.7   PROCALCITONIN ng/mL 0.07     Estimated Creatinine Clearance: 131.1 mL/min (by C-G formula based on SCr of 0.65 mg/dL).  Brief Urine Lab Results  (Last result in the past 365 days)      Color   Clarity   Blood   Leuk Est   Nitrite   Protein   CREAT   Urine HCG        07/18/20 1618 Yellow Clear Negative Negative Negative Negative             Imaging Results (Last 24 Hours)     Procedure Component Value Units Date/Time    XR Chest 1 View [[id number]] Collected:  07/18/20 1557     Updated:  07/18/20 1825    Narrative:          EXAMINATION: XR CHEST 1 VW - 07/18/2020     INDICATION: Fever      COMPARISON: NONE     FINDINGS: Portable chest reveals cardiac and mediastinal silhouettes  within normal limits. The lung fields are clear. No focal parenchymal  opacification is present. No pleural effusion or pneumothorax. The bony  structures are unremarkable. The pulmonary vascularity is within normal  limits.         Impression:       No acute cardiopulmonary disease.     DICTATED:   07/18/2020  EDITED/ls :   07/18/2020                    Assessment/Plan   Assessment & Plan     Active Hospital Problems    Diagnosis POA   • **Labial abscess [N76.4] Unknown     Priority: High   • Sepsis (CMS/HCC) [A41.9] Yes     Priority: High   • PCOS (polycystic  ovarian syndrome) [E28.2] Unknown   • Overweight [E66.3] Unknown     Margarita Adair is a 39 y.o. female with pmh of PCOS, fatty liver, overweight and remote tobacco abuse (quit in 2006).  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  As a note, pt had a known COVID exposure on 7/1, tested negative on 7/2 and has worked throughout without any fever or respiratory s/s, and no loss of taste or smell.  She was under daily monitoring of temp and symptoms until 2 days ago when she was release as her 14 days had passed.     Today pt presents to ER this am with cc of Rt labial lesion that was pea sized and appeared on Friday.  Pain worsened and yesterday the lesion was approx grape sized.  No fever, chills, n/v, cough, h/a, diarrhea. She reports she has some left over bactrim and had taken 2 doses prior to coming to ER today.  Pain worsened and she was seen in our ER this am and dxd with Rt bartholin's gland abscess.  Lesion was lanced/packed and pt was started on Bactrim DS and given percocet.  Dcd home.  Later today she developed fever of 102 and n/v with worsening rt groin pain.  She came back to ER.  Dxd with sepsis associated with abscess.  Blood cx pending. Started on Vanc/zosyn and IVFs. Will admit to hospitalist service and consult ID and gyn.      Assessment/plan:    Sepsis (fever, tachycardia, leukocytosis)   (tachy, mild hypotension, WBC 21.05, fever 102.1 and source)  Right labia majora abscess/cellulitis  Rt labia pain  --s/p I&D and packed in ER this am. Was sent home with percocet and bactrim DS  --back to ER this afternoon worsening.  --bld cx x 2 pending, procal and lactate wnl  --CXR wnl  --WBC 21.05  --started on Vanc/zosyn. 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 Sats wnl.   --no current covid s/s, with new fever associated with Rt labial abscess and sepsis.      Hx PCOS  --sees Dr Janet WOODALLPN outpt     -----------------------------------------------------    Plan:  -zosyn & vanc  -follow blood cultures  -iv fluids  -analgesics & antiemetics  -right vulva incision & packing in ED on 7/18/20 a.m.; Dr. Lynn (gynecology) has kindly agreed to see patient tomorrow  -am consult ID (to assist in antibiotic choice, earlier discharge, etc)      Am labs: cbc,bmp      DVT prophylaxis:  Sequentials    CODE STATUS:    Code Status and Medical Interventions:   Ordered at: 07/18/20 1846     Level Of Support Discussed With:    Patient     Code Status:    CPR     Medical Interventions (Level of Support Prior to Arrest):    Full       Admission Status:  I believe this patient meets inpatient status due to need for iv antibiotics, fluids, expected stay > 2 midnights    Electronically signed by MARCELLO Ramirez, 07/18/20, 6:47 PM.      Attending   Admission Attestation       I have seen and examined the patient, performing an independent face-to-face diagnostic evaluation with plan of care reviewed and developed with the advanced practice clinician (APC).      Brief Summary Statement:   Margarita Adair is a 39 y.o. female presents with right vulvar painful lesion 2 days ago, presented to [facility] ED earlier today at which time had incision/drainage and packing performed and was discharged w/ bactrim. After leaving developed n/v, fever 102 and returned. Wbc 21,000, temp 102, tachycardic. Given zosyn, vanc and admitted to hospitalist service. 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    Remainder of detailed HPI is as noted by APC and has been reviewed and/or edited by me for completeness.    Attending Physical Exam:  Constitutional:Alert, oriented x 3, nontoxic appearing  Psych:Normal/appropriate affect  HEENT:Ncat, oroph clear  Neck: neck supple, full range of motion  Neuro: Face symmetric, speech clear, equal , moves all extremities  Cardiac: Rrr; No pretibial pitting edema  Resp: Ctab, normal effort  GI: abd soft, nontender  Skin: No extremity rash  Musculoskeletal/extremities: no cyanosis extremities; no significant ankle edema  Genital: right vulva edematous, red, tender, packing in place        Brief Assessment/Plan :  See detailed assessment and plan developed with APC which I have reviewed and/or edited for completeness.    Electronically signed by Delano Ma MD, 07/18/20, 7:47 PM.              Electronically signed by Delano Ma MD at 07/18/20 2047          Emergency Department Notes      Golden Hedrick MD at 07/18/20 1523           EMERGENCY DEPARTMENT ENCOUNTER    Room Number:  18/18  Date of encounter:  7/18/2020  PCP: Provider, No Known  Historian: Patient      HPI:  Chief Complaint: Fever and right groin lesion    A complete HPI/ROS/PMH/PSH/SH/FH are unobtainable due to: N/A    Context: Margarita Adair is a 39 y.o. female who presents to the ED c/o symptoms which started roughly 48 hours ago.  She noticed a small bump on her right labia majora.  This progressed until early this morning when she came to our emergency department and underwent abscess I&D with packing performed by Dr. Peacock.  The patient had already started on antibiotics the day before presentation to the emergency department.  She had leftover Bactrim DS and had taken 2 doses prior to arrival in the emergency department.  She underwent abscess I&D and was discharged to home.  She began vomiting after leaving the emergency department and getting to the parking lot.  Since then  she has remained nauseated.  She reports shaking chills with a an oral temperature greater than 102 degrees.  Her last dose of antipyretic was 3 hours ago and was ibuprofen.  She has not had any Tylenol since last night.  She rates her groin pain moderate in severity.  She took 1 Percocet but notes that she vomited relatively shortly thereafter.  She remembered that she developed severe nausea anytime she does take Percocet but had already taken the medication.  She denies cough, dyspnea, abdominal pain, dysuria/hematuria, bowel changes, COVID exposures, loss of taste or smell.      PAST MEDICAL HISTORY  Active Ambulatory Problems     Diagnosis Date Noted   • No Active Ambulatory Problems     Resolved Ambulatory Problems     Diagnosis Date Noted   • No Resolved Ambulatory Problems     Past Medical History:   Diagnosis Date   • Fatty liver    • PCOS (polycystic ovarian syndrome)          PAST SURGICAL HISTORY  No past surgical history on file.      FAMILY HISTORY  No family history on file.      SOCIAL HISTORY  Social History     Socioeconomic History   • Marital status:      Spouse name: Not on file   • Number of children: Not on file   • Years of education: Not on file   • Highest education level: Not on file         ALLERGIES  Oxycodone        REVIEW OF SYSTEMS  Review of Systems     All systems reviewed and negative except for those discussed in HPI.       PHYSICAL EXAM    I have reviewed the triage vital signs and nursing notes.    ED Triage Vitals [07/18/20 1445]   Temp Heart Rate Resp BP SpO2   100.5 °F (38.1 °C) 112 20 116/97 97 %      Temp src Heart Rate Source Patient Position BP Location FiO2 (%)   Oral Monitor Sitting Left arm --       Physical Exam  GENERAL:  Well developed.  Appears in mild distress.  Has blanket in place but still appears chilled.  Axillary temperature checked by myself 102.1.  HENT: Nares patent  EYES: No scleral icterus  CV: Regular rhythm, regular rate  RESPIRATORY: Normal  effort.  No audible wheezes, rales or rhonchi  ABDOMEN: Soft, nontender  MUSCULOSKELETAL: No deformities.   NEURO: Alert, moves all extremities, follows commands.  No nuchal rigidity.  Supple neck.  SKIN: Warm, dry, no rash visualized  GENITOURINARY:  Right inferior labia majora erythematous with mild induration but no fluctuance.  Surgical wound is present with small amount of packing at the incision site.    Lymphatics :  right inguinal lymph nodes quite tender to palpation without obvious enlargement.    LAB RESULTS  No results found for this or any previous visit (from the past 24 hour(s)).    Ordered the above labs and independently reviewed the results.        RADIOLOGY  No Radiology Exams Resulted Within Past 24 Hours          PROCEDURES    Procedures      MEDICATIONS GIVEN IN ER    Medications   sodium chloride 0.9 % bolus 1,000 mL (has no administration in time range)   vancomycin 1750 mg/500 mL 0.9% NS IVPB ([facility]) (has no administration in time range)   piperacillin-tazobactam (ZOSYN) 3.375 g in iso-osmotic dextrose 50 ml (premix) (has no administration in time range)         PROGRESS, DATA ANALYSIS, CONSULTS, AND MEDICAL DECISION MAKING    All labs have been independently reviewed by me.  All radiology studies have been reviewed by me and the radiologist dictating the report.   EKG's have been independently viewed and interpreted by me.            ED Course as of Jul 18 2335   Sat Jul 18, 2020   1659 I have paged the hospitalist for admission.    [MS]   1700 I have ordered broad-spectrum antibiotics as well as pain and nausea medicine.  We are infusing a liter normal saline.  We have will administer Tylenol.    [MS]      ED Course User Index  [MS] Golden Hedrick MD             AS OF 15:23 VITALS:    BP - 116/97  HR - 112  TEMP - 100.5 °F (38.1 °C) (Oral)  O2 SATS - 97%        DIAGNOSIS  Final diagnoses:   Acute febrile illness   Labial abscess   Cellulitis of groin         DISPOSITION  Admission             Golden Hedrick MD  07/18/20 2336      Electronically signed by Golden Hedrick MD at 07/18/20 2336       Operative/Procedure Notes (last 72 hours) (Notes from 07/17/20 1348 through 07/20/20 1348)    No notes of this type exist for this encounter.            Physician Progress Notes (last 72 hours) (Notes from 07/17/20 1349 through 07/20/20 1349)      Betty Crowder DO at 07/20/20 1158          Daily Progress Note    Patient name: Margarita Adair  YOB: 1980   MRN: [id number]  Referring Provider: eDlano Ma  Admission Date: 7/18/2020  Date of Service: 7/20/2020    Margarita Adair is a 39 y.o.    at Unknown  admitted on 7/18/2020 for Sepsis (CMS/HCC) from abscess of the right labia majora s/p  I & D.    Hospital day 2    Diagnoses:   Patient Active Problem List   Diagnosis   • Sepsis (CMS/HCC)   • PCOS (polycystic ovarian syndrome)   • Labial abscess   • Overweight   • Fever   • Leukocytosis   • Tachycardia       Prenatal Labs  Lab Results   Component Value Date    HGB 12.0 07/20/2020       Subjective:      Margarita is feeling better today.  She states that the pain is exponentially decreased. She states the redness is decreasing. She is able to ambulate without discomfort. She is eating without difficulty.    Objective:     Vital signs:  Vital Signs Range for the last 24 hours  Temperature: Temp:  [98.1 °F (36.7 °C)-100 °F (37.8 °C)] 98.7 °F (37.1 °C)   Temp Source: Temp src: Oral   BP: BP: ()/(40-75) 99/47   Pulse: Heart Rate:  [59-90] 77   Respirations: Resp:  [16-18] 18   Weight: 86.2 kg (190 lb)     General: Alert & oriented x4, in no apparent distress  HEENT: Grossly normal  Resp: Unlabored breathing  Abdomen: Soft, nontender  Pelvis:  Right labia is not draining since they did cultures again yesterday.  Feels a little indurated but non-painful. Right inguinal area is now non-tender.   Extremities: Nontender, no pain, no edema   Neurologic:  Alert & oriented x 4,  no focal deficits  noted  Psychiatric:  Speech and behavior appropriate     Medications:    DAPTOmycin 4 mg/kg (Adjusted) Intravenous Q24H   heparin (porcine) 5,000 Units Subcutaneous Q8H   piperacillin-tazobactam 3.375 g Intravenous Q8H   sodium chloride 10 mL Intravenous Q12H      •  acetaminophen  •  docusate sodium  •  HYDROmorphone **AND** naloxone  •  ibuprofen  •  ondansetron **OR** ondansetron  •  sodium chloride  •  traMADol    Labs:  Lab Results   Component Value Date    WBC 7.01 2020    HGB 12.0 2020    HCT 36.7 2020    MCV 93.4 2020     2020    AST 35 (H) 2020    ALT 35 (H) 2020           Assessment/Plan:      Margarita is a 39 y.o.  With abscess of right labia majora s/p I & D with  Sepsis (CMS/[facility]), improving    Hospital Problem List     * (Principal) Sepsis (CMS/[facility])    PCOS (polycystic ovarian syndrome) (Chronic)    Labial abscess    Overweight (Chronic)        Fever    Leukocytosis    Tachycardia      Assessment:  1.  Abscess of the right labia majora s/p I&D in patient with history of hydradenitis suppurativa and without history of diabetes mellitus. No evidence of spread outside of right labia.  2.  Improving    Plan:  1.  Continue broad spectrum antibiotics per ID recommendations  2.  Patient states I & D are waiting for cultures to come back to see if they need to open the wound again or not.    3.  Continue recommendations from Infectious Disease    I spent 15 minutes with this patient of which greater than 50% was face to face counseling and coordination of care.  All questions were answered to the best of my ability.    Betty Crowder DO  2020 11:58    Electronically signed by Betty Crowder DO at 20 1208     Chelsea Irvin APRN at 20 1033              [facility] Medicine Services  PROGRESS NOTE    Patient Name: Margarita Adair  : 1980  MRN: [id number]    Date of Admission: 2020  Primary Care Physician:  Nadira Pritchard, APRN    Subjective   Subjective     CC:  Sepsis    HPI:  Patient is a 39-year-old who presented to the emergency department with a worsening abscess in her labia.  She was admitted with sepsis (criteria including fever 102, tachycardia with a heart rate 112, leukocytosis with white blood cell count 21.05).  The abscess was drained and packed in the emergency room last night.  Initial culture swab was obtained but apparently not found in the lab.  Repeat culture was obtained by infectious disease this morning.  She is feeling some better this morning.  She continues on Zosyn IV.  Gynecology is consulted and plans to see her today as well.  She reports she uses a electric razor for trimming groin hair.  She has had previous episodes of groin abscesses and feels that recently swimming and sitting in a wet bathing suit may have exacerbated the current abscess.  She requests stool softener for constipation.  She requests Motrin in addition to Tylenol for pain.    7/20/2020- Patient rested well last night. Had fever chills around 9pm, but nothing further. Swelling in labia and groin improving. Able to walk better. No intake or voiding issue    Review of Systems  Gen- + fevers, chills  CV- No chest pain, palpitations  Resp- No cough, dyspnea  GI- No N/V/D, abd pain  - swelling and pain in right groin/ labia- no drainage           Objective   Objective     Vital Signs:   Temp:  [98.1 °F (36.7 °C)-100 °F (37.8 °C)] 99.3 °F (37.4 °C)  Heart Rate:  [59-90] 86  Resp:  [16-18] 18  BP: ()/(40-75) 105/60        Physical Exam:  Constitutional: No acute distress, awake, alert  HENT: NCAT, mucous membranes moist  Respiratory: Clear to auscultation bilaterally, respiratory effort normal   Cardiovascular: RRR, no murmurs, rubs, or gallops, palpable pedal pulses bilaterally  Gastrointestinal: Positive bowel sounds, soft, nontender, nondistended  Musculoskeletal: No bilateral ankle edema  Psychiatric:  Appropriate affect, cooperative  Neurologic: Oriented x 3, strength symmetric in all extremities, Cranial Nerves grossly intact to confrontation, speech clear  Skin: right labia with mild erythema and swelling- pinpoint pustule on labia. Noted mild right groin swelling/ lymph nodes        Results Reviewed:  Results from last 7 days   Lab Units 07/20/20  0832 07/19/20  0651 07/18/20  1559   WBC 10*3/mm3 7.01 11.65* 21.05*   HEMOGLOBIN g/dL 12.0 11.5* 13.0   HEMATOCRIT % 36.7 35.0 37.7   PLATELETS 10*3/mm3 270 227 298   PROCALCITONIN ng/mL 0.08  --  0.07     Results from last 7 days   Lab Units 07/20/20  0832 07/19/20  0651 07/18/20  1559   SODIUM mmol/L 138 137 134*   POTASSIUM mmol/L 3.8 3.6 3.8   CHLORIDE mmol/L 107 109* 102   CO2 mmol/L 21.0* 22.0 22.0   BUN mg/dL 6 5* 11   CREATININE mg/dL 0.58 0.56* 0.65   GLUCOSE mg/dL 169* 86 89   CALCIUM mg/dL 8.7 7.3* 8.8   ALT (SGPT) U/L 35*  --  12   AST (SGOT) U/L 35*  --  15     Estimated Creatinine Clearance: 147 mL/min (by C-G formula based on SCr of 0.58 mg/dL).    Microbiology Results Abnormal     Procedure Component Value - Date/Time    Wound Culture - Wound, Groin [[id number]]  (Abnormal) Collected:  07/19/20 0847    Lab Status:  Preliminary result Specimen:  Wound from Groin Updated:  07/20/20 0827     Wound Culture Rare Bacillus species     Comment:   Most Bacillus species are susceptible to vancomycin, aminoglycosides, clindamycin, chloramphenicol, and erythromycin.        Gram Stain Rare (1+) WBCs seen      No organisms seen    Blood Culture - Blood, Arm, Right [[id number]] Collected:  07/18/20 1559    Lab Status:  Preliminary result Specimen:  Blood from Arm, Right Updated:  07/19/20 1630     Blood Culture No growth at 24 hours    Blood Culture - Blood, Arm, Right [[id number]] Collected:  07/18/20 1605    Lab Status:  Preliminary result Specimen:  Blood from Arm, Right Updated:  07/19/20 1630     Blood Culture No growth at 24 hours          Imaging Results (Last  24 Hours)     Procedure Component Value Units Date/Time    XR Chest 1 View [[id number]] Collected:  07/18/20 1557     Updated:  07/19/20 1537    Narrative:          EXAMINATION: XR CHEST 1 VW - 07/18/2020     INDICATION: Fever      COMPARISON: NONE     FINDINGS: Portable chest reveals cardiac and mediastinal silhouettes  within normal limits. The lung fields are clear. No focal parenchymal  opacification is present. No pleural effusion or pneumothorax. The bony  structures are unremarkable. The pulmonary vascularity is within normal  limits.         Impression:       No acute cardiopulmonary disease.     DICTATED:   07/18/2020  EDITED/ls :   07/18/2020      This report was finalized on 7/19/2020 3:34 PM by Dr. Rocio Weiss MD.                  I have reviewed the medications:  Scheduled Meds:    DAPTOmycin 4 mg/kg (Adjusted) Intravenous Q24H   heparin (porcine) 5,000 Units Subcutaneous Q8H   piperacillin-tazobactam 3.375 g Intravenous Q8H   sodium chloride 10 mL Intravenous Q12H     Continuous Infusions:   PRN Meds:.•  acetaminophen  •  docusate sodium  •  HYDROmorphone **AND** naloxone  •  ibuprofen  •  ondansetron **OR** ondansetron  •  sodium chloride  •  traMADol    Assessment/Plan   Assessment & Plan     Active Hospital Problems    Diagnosis  POA   • **Sepsis (CMS/HCC) [A41.9]  Yes   • Fever [R50.9]  Yes   • Leukocytosis [D72.829]  Yes   • Tachycardia [R00.0]  Yes   • PCOS (polycystic ovarian syndrome) [E28.2]  Unknown   • Labial abscess [N76.4]  Unknown   • Overweight [E66.3]  Unknown      Resolved Hospital Problems   No resolved problems to display.        Brief Hospital Course to date:  Margarita Adair is a 39 y.o. female admitted with sepsis secondary to labial abscess.  Infectious disease and gynecology are consulted and following.    Acute sepsis secondary to labial abscess  -Clinically improving. WBC WNL  -Currently on daptomycin and Zosyn per infectious disease- watch cultures overnight for further  abx management. Wound cx rare bacillus   -Gynecology is consulted and will follow    Fever  -Continue Tylenol Motrin as needed    Leukocytosis  -resolved  -White blood cell count initially 21.05, now 7    Tachycardia  -Improving with IV fluids and IV antibiotics  -Discontinued IV fluids on 2020- continue to monitor and encourage oral intake    Overweight    History of PCOS      DVT Prophylaxis: Heparin    Disposition: I expect the patient to be discharged pending duration of her IV antibiotics, transition to oral antibiotics (per infectious disease), evaluation by gynecology - ~ 1-2 days    CODE STATUS:   Code Status and Medical Interventions:   Ordered at: 20 1846     Level Of Support Discussed With:    Patient     Code Status:    CPR     Medical Interventions (Level of Support Prior to Arrest):    Full         Electronically signed by MARCELLO Dan, 20, 10:33.        Electronically signed by Chelsea Irvin APRN at 20 1041     Martha Beal MD at 20 1027              [facility] Medicine Services  PROGRESS NOTE    Patient Name: Margarita Adair  : 1980  MRN: [id number]    Date of Admission: 2020  Primary Care Physician: Nadira Pritchard APRN    Subjective   Subjective     CC:  Sepsis    HPI:  Patient is a 39-year-old who presented to the emergency department with a worsening abscess in her labia.  She was admitted with sepsis (criteria including fever 102, tachycardia with a heart rate 112, leukocytosis with white blood cell count 21.05).  The abscess was drained and packed in the emergency room last night.  Initial culture swab was obtained but apparently not found in the lab.  Repeat culture was obtained by infectious disease this morning.  She is feeling some better this morning.  She continues on Zosyn IV.  Gynecology is consulted and plans to see her today as well.  She reports she uses a electric razor for trimming groin  hair.  She has had previous episodes of groin abscesses and feels that recently swimming and sitting in a wet bathing suit may have exacerbated the current abscess.  She requests stool softener for constipation.  She requests Motrin in addition to Tylenol for pain.    Review of Systems  General: Positive fever, chills, fatigue  ENT: No sore throat, trouble swallowing or changes in vision  Respiratory: No shortness of breath, cough, wheezing or fast breathing  Cardiovascular: No chest pain, palpitations, dyspnea with exertion  Gastrointestinal: No nausea vomiting abdominal pain  Musculoskeletal: No difficulty walking, no weakness  Vascular: No cyanosis or clubbing  Lymphatic: No peripheral edema, no lymphadenopathy  Neurologic: No headache, confusion, dizziness  Psychiatric: No changes in mood.  No depression or anxiety.  Genital: Pain at the abscess site       Objective   Objective     Vital Signs:   Temp:  [98.8 °F (37.1 °C)-102.1 °F (38.9 °C)] 100.5 °F (38.1 °C)  Heart Rate:  [] 95  Resp:  [17-20] 18  BP: ()/(56-97) 109/73        Physical Exam:  Constitutional: No acute distress, awake, alert, nontoxic, normal body habitus  Eyes: Pupils equal, sclerae anicteric, no conjunctival injection  HENT: NCAT, mucous membranes moist  Neck: Supple, no thyromegaly, no lymphadenopathy  Respiratory: Clear to auscultation bilaterally, good effort, nonlabored respirations   Cardiovascular: RRR  Gastrointestinal: Positive bowel sounds, soft, nontender, nondistended  Musculoskeletal: No peripheral edema, normal muscle tone for age  Psychiatric: Appropriate affect, good insight and judgement, cooperative  Neurologic: Oriented x 3, movements symmetric BUE and BLE, Cranial Nerves grossly intact, speech clear and fluent  Skin: Abscess with packing labia      Results Reviewed:  Results from last 7 days   Lab Units 07/19/20  0651 07/18/20  1559   WBC 10*3/mm3 11.65* 21.05*   HEMOGLOBIN g/dL 11.5* 13.0   HEMATOCRIT % 35.0 37.7    PLATELETS 10*3/mm3 227 298   PROCALCITONIN ng/mL  --  0.07     Results from last 7 days   Lab Units 07/19/20  0651 07/18/20  1559   SODIUM mmol/L 137 134*   POTASSIUM mmol/L 3.6 3.8   CHLORIDE mmol/L 109* 102   CO2 mmol/L 22.0 22.0   BUN mg/dL 5* 11   CREATININE mg/dL 0.56* 0.65   GLUCOSE mg/dL 86 89   CALCIUM mg/dL 7.3* 8.8   ALT (SGPT) U/L  --  12   AST (SGOT) U/L  --  15     Estimated Creatinine Clearance: 152.2 mL/min (A) (by C-G formula based on SCr of 0.56 mg/dL (L)).    Microbiology Results Abnormal     Procedure Component Value - Date/Time    Wound Culture - Wound, Groin [[id number]] Collected:  07/19/20 0847    Lab Status:  Preliminary result Specimen:  Wound from Groin Updated:  07/19/20 0925     Gram Stain Rare (1+) WBCs seen      No organisms seen          Imaging Results (Last 24 Hours)     Procedure Component Value Units Date/Time    XR Chest 1 View [[id number]] Collected:  07/18/20 1557     Updated:  07/18/20 1825    Narrative:          EXAMINATION: XR CHEST 1 VW - 07/18/2020     INDICATION: Fever      COMPARISON: NONE     FINDINGS: Portable chest reveals cardiac and mediastinal silhouettes  within normal limits. The lung fields are clear. No focal parenchymal  opacification is present. No pleural effusion or pneumothorax. The bony  structures are unremarkable. The pulmonary vascularity is within normal  limits.         Impression:       No acute cardiopulmonary disease.     DICTATED:   07/18/2020  EDITED/ls :   07/18/2020                      I have reviewed the medications:  Scheduled Meds:    DAPTOmycin 4 mg/kg (Adjusted) Intravenous Q24H   heparin (porcine) 5,000 Units Subcutaneous Q8H   piperacillin-tazobactam 3.375 g Intravenous Q8H   sodium chloride 10 mL Intravenous Q12H     Continuous Infusions:   PRN Meds:.•  acetaminophen  •  docusate sodium  •  HYDROmorphone **AND** naloxone  •  ibuprofen  •  ondansetron **OR** ondansetron  •  sodium chloride  •  traMADol    Assessment/Plan   Assessment &  Plan     Active Hospital Problems    Diagnosis  POA   • **Sepsis (CMS/HCC) [A41.9]  Yes   • Fever [R50.9]  Yes   • Leukocytosis [D72.829]  Yes   • Tachycardia [R00.0]  Yes   • PCOS (polycystic ovarian syndrome) [E28.2]  Unknown   • Labial abscess [N76.4]  Unknown   • Overweight [E66.3]  Unknown      Resolved Hospital Problems   No resolved problems to display.        Brief Hospital Course to date:  Margarita Adair is a 39 y.o. female admitted with sepsis secondary to labial abscess.  Infectious disease and gynecology are consulted and following.    Acute sepsis secondary to labial abscess  -Clinically improving  -Currently on daptomycin and Zosyn per infectious disease  -Gynecology is consulted and will follow  -Culture sent on 7/19/2020    Fever  -Continue Tylenol Motrin as needed    Leukocytosis  -Clinically improving  -White blood cell count initially 21.05, improved to 11.65    Tachycardia  -Improving with IV fluids and IV antibiotics  -Discontinued IV fluids on 7/19/2020    Overweight    History of PCOS      DVT Prophylaxis: Heparin    Disposition: I expect the patient to be discharged pending duration of her IV antibiotics, transition to oral antibiotics (per infectious disease), evaluation by gynecology with continued wound packing.    CODE STATUS:   Code Status and Medical Interventions:   Ordered at: 07/18/20 1846     Level Of Support Discussed With:    Patient     Code Status:    CPR     Medical Interventions (Level of Support Prior to Arrest):    Full         Electronically signed by Martha Beal MD, 07/19/20, 10:35.        Electronically signed by Martha Beal MD at 07/19/20 1037          Consult Notes (last 72 hours) (Notes from 07/17/20 1349 through 07/20/20 1349)      Delmi Lynn MD at 07/19/20 1035      Consult Orders    1. Inpatient Obstetrics / Gynecology Consult [[id number]] ordered by Delano Ma MD at 07/18/20 2046                 Tulio Avila  Giovany  : 1980  MRN: [id number]  [facility]: [id number]    Consult Requested By: Delano Ma MD   Consulting Service: Gynecology   Reason for Consultation: Labial abscess   Date of consultation: 2020       Subjective   Margarita Adair is a 39 y.o. year old who presents through the ED for a labial abscess.  She has a history of hydradenitis suppurativa which had been maintained on daily oral tetracycline in the remote past.  She has had one abscess lanced and drained with her dermatologist, but has never required hospitalization for an abscess in the past.  She does not have diabetes.     Past Medical History:   Diagnosis Date   • Fatty liver    • Overweight    • PCOS (polycystic ovarian syndrome)      Past Surgical History:   Procedure Laterality Date   • ADENOIDECTOMY     • APPENDECTOMY     • CHOLECYSTECTOMY     • TONSILLECTOMY       OB History   No data available     Social History    Tobacco Use      Smoking status: Former Smoker        Quit date:         Years since quittin.5      Smokeless tobacco: Never Used      Current Facility-Administered Medications:   •  acetaminophen (TYLENOL) tablet 650 mg, 650 mg, Oral, Q4H PRN, Radha Fraser, APRN, 650 mg at 20 0812  •  DAPTOmycin (CUBICIN) 300 mg in sodium chloride 0.9 % 50 mL IVPB, 4 mg/kg (Adjusted), Intravenous, Q24H, David Dorsey APRN, Last Rate: 100 mL/hr at 20 1028, 300 mg at 20 1028  •  docusate sodium (COLACE) capsule 100 mg, 100 mg, Oral, BID PRN, Martha Beal MD  •  heparin (porcine) 5000 UNIT/ML injection 5,000 Units, 5,000 Units, Subcutaneous, Q8H, Martha Beal MD  •  HYDROmorphone (DILAUDID) injection 0.5 mg, 0.5 mg, Intravenous, Q2H PRN, 0.5 mg at 20 1028 **AND** naloxone (NARCAN) injection 0.4 mg, 0.4 mg, Intravenous, Q5 Min PRN, Radha Fraser, APRN  •  ibuprofen (ADVIL,MOTRIN) tablet 600 mg, 600 mg, Oral, Q6H PRN, Martha Beal MD  •  ondansetron (ZOFRAN)  "tablet 4 mg, 4 mg, Oral, Q6H PRN **OR** ondansetron (ZOFRAN) injection 4 mg, 4 mg, Intravenous, Q6H PRN, Radha Fraser APRN  •  piperacillin-tazobactam (ZOSYN) 3.375 g in iso-osmotic dextrose 50 ml (premix), 3.375 g, Intravenous, Q8H, Josep Rao, [facility], 3.375 g at 07/19/20 0812  •  sodium chloride 0.9 % flush 10 mL, 10 mL, Intravenous, Q12H, Radha Fraser APRN  •  sodium chloride 0.9 % flush 10 mL, 10 mL, Intravenous, PRN, Radha Fraser APRN  •  traMADol (ULTRAM) tablet 50 mg, 50 mg, Oral, Q6H PRN, Radha Fraser APRN    Allergies   Allergen Reactions   • Oxycodone GI Intolerance       Review of Systems     Objective   /73 (BP Location: Left arm, Patient Position: Lying)   Pulse 95   Temp 100.5 °F (38.1 °C) (Oral)   Resp 18   Ht 167.6 cm (66\")   Wt 86.2 kg (190 lb)   LMP 07/03/2020   SpO2 98%   BMI 30.67 kg/m²    General: well developed; well nourished  no acute distress   Heart: Not performed.   Lungs: breathing is unlabored   Abdomen: soft, non-tender; no masses  no umbilical or inguinal hernias are present  no hepato-splenomegaly   Pelvic:  Right labia majora with a 3 mm defect draining serosanguinous fluid.  Erythema and edema of the labia, not extending to the mons nor the perineal body.   Right inguinal adenopathy, tender.        Labs  CBC:   Lab Results   Component Value Date     07/19/2020    HGB 11.5 (L) 07/19/2020    HCT 35.0 07/19/2020    WBC 11.65 (H) 07/19/2020     CBC w/ diff:   Lab Results   Component Value Date     07/19/2020    HGB 11.5 (L) 07/19/2020    HCT 35.0 07/19/2020    MCV 94.3 07/19/2020    RDW 12.2 (L) 07/19/2020    WBC 11.65 (H) 07/19/2020    NEUTRORELPCT 82.1 (H) 07/19/2020    AUTOIGPER 0.5 07/19/2020    LYMPHORELPCT 11.2 (L) 07/19/2020    MONORELPCT 5.7 07/19/2020    EOSRELPCT 0.3 07/19/2020    BASORELPCT 0.2 07/19/2020     CMP:   Lab Results   Component Value Date     07/19/2020    K 3.6 " 07/19/2020     (H) 07/19/2020    CO2 22.0 07/19/2020    BUN 5 (L) 07/19/2020    CREATININE 0.56 (L) 07/19/2020    GLUCOSE 86 07/19/2020    ALBUMIN 4.10 07/18/2020    CALCIUM 7.3 (L) 07/19/2020    AST 15 07/18/2020    ALT 12 07/18/2020    BILITOT 0.5 07/18/2020     Blood and wound cultures pending.           Assessment   1. Abscess of the right labia majora s/p I&D in patient with history of hydradenitis suppurativa and without history of diabetes mellitus. No evidence of spread outside of right labia.      Plan   1. Continue broad spectrum antibiotics per ID recommendations.  Serial labs and examinations.  Treatment goal of outpatient management and consultation with her dermatologist for ongoing management of HS discussed with patient.  Consider additional surgical intervention as clinically indicated. Questions answered.       Delmi Lynn MD  7/19/2020  10:36      Electronically signed by Delmi Lynn MD at 07/19/20 1046     Fredrick Mccloud MD at 07/19/20 0720      Consult Orders    1. Inpatient Infectious Diseases Consult [[id number]] ordered by Radha Fraser APRN at 07/18/20 1846                INFECTIOUS DISEASE CONSULT/INITIAL HOSPITAL VISIT    Margarita Adair  1980  [id number]    Date of Consult: 7/19/2020    Admission Date: 7/18/2020      Requesting Provider: Radha ARMENDARIZ  Evaluating Physician: Dr. Fredrick Mccloud    Reason for Consultation: Sepsis/Bartholin's gland abscess    Chief complaint: Right labial abscess with associated nausea/vomiting, fever    History of present illness:    Ms. Margarita Aragon is a 39 y.o. female is being evaluated for sepsis and right labial abscess.  She has a past medical history of hidradenitis and has followed with [facility] in past; polycystic ovarian syndrome, fatty liver, obesity and remote tobacco abuse (quit in 2006).  She presented to the ED on 7/18 with complaints of right labial lesion that was approximately pea-sized that  originally appeared this past Friday.  Pain progressively worsened on Saturday and lesion progressed to approximately grape sized.  Due to her pain she presented to the ED for further evaluation and treatment on the morning of 7/18.  Lesion was lanced and patient was discharged home on Bactrim DS.  Later that afternoon/evening the patient spiked a fever of 102 as well as nausea and vomiting with worsening right groin pain.  She presented back to the ED where she was diagnosed with sepsis, blood cultures were collected and she was started on Vanco and Zosyn.  She is admitted for further evaluation and treatment.    Since admission, she has had T-max of 102.1 with some hypotension noted.  Most recent labs show WBC of 21.05 with a neutrophilia of 87.6%.  Procalcitonin was 0.07 and lactate was 0.7.  She had a lymphocyte percent of 4.4 with an absolute lymphocyte count of 0.92.  CMP was unremarkable.  UA was not impressive for UTI.  Blood cultures have been collected and are in progress.  Chest x-ray was collected and showed no acute cardiopulmonary process.    She has no listed antibiotic allergies and she received vancomycin and Zosyn IV.  ID has been consulted for further evaluation and treatment as well as antimicrobial therapy management.    She has worked as an OR circulator    Of note: Patient had a known COVID-19 exposure on 7/1.  She did test negative on 7/2 and has remained at work checking her temperature daily for 2 weeks.  She did not have any  respiratory symptoms, loss of taste or smell.    Pain at labia has been constant, nonrad, worse with pressure, better since admit and with abx, 3-5 / 10 at present and not progressive;  nonradiating    No HA/photophobia or neck stiffness; no rash;  No dysuria/hematuria or pyuria/ no abd pain;  No N/V/D and no SOB/cough    She has been getting itching/flushed with vancomycin infusions    No other blunt force or penetrating trauma;  No fresh/brackish or salt water  exposure; No raw or undercooked food.  No unpasteurized milk or milk products.  No animal insect or arthropod exposure.  No tick bites.  No outdoor camping or hunting exposure.  No travel exposure.  No ill exposure.  No history of TB or TB exposure.   Denies a history of MRSA/VRE and no history of C. difficile or ESBL/KPC  organisms.      Past Medical History:   Diagnosis Date   • Fatty liver    • Overweight    • PCOS (polycystic ovarian syndrome)        Past Surgical History:   Procedure Laterality Date   • ADENOIDECTOMY     • APPENDECTOMY     • CHOLECYSTECTOMY     • TONSILLECTOMY         Family History   Problem Relation Age of Onset   • Breast cancer Mother    • Hypertension Father    • No Known Problems Brother    • Autism Daughter    • ADD / ADHD Son        Social History     Socioeconomic History   • Marital status:      Spouse name: Not on file   • Number of children: Not on file   • Years of education: Not on file   • Highest education level: Not on file   Tobacco Use   • Smoking status: Former Smoker     Last attempt to quit:      Years since quittin.5   • Smokeless tobacco: Never Used   Substance and Sexual Activity   • Alcohol use: Yes     Comment: reports she drinks a beer or glass of wine most evenings   • Drug use: Never   • Sexual activity: Defer     Comment:  and lives with         Allergies   Allergen Reactions   • Oxycodone GI Intolerance         Medication:    Current Facility-Administered Medications:   •  [START ON 2020] ! Vancomycin trough  @1630. Please hold 1700 dose until evaluated by pharmacy., , Does not apply, Once, Josep Rao, [facility]  •  acetaminophen (TYLENOL) tablet 650 mg, 650 mg, Oral, Q4H PRN, Radha Fraser, MARCELLO, 650 mg at 20 2327  •  HYDROmorphone (DILAUDID) injection 0.5 mg, 0.5 mg, Intravenous, Q2H PRN, 0.5 mg at 20 2332 **AND** naloxone (NARCAN) injection 0.4 mg, 0.4 mg, Intravenous, Q5 Min PRN, Bria  MARCELLO Mao  •  ondansetron (ZOFRAN) tablet 4 mg, 4 mg, Oral, Q6H PRN **OR** ondansetron (ZOFRAN) injection 4 mg, 4 mg, Intravenous, Q6H PRN, Radha Fraser APRN  •  Pharmacy to dose vancomycin, , Does not apply, Continuous PRN, Radha Fraser APRN  •  piperacillin-tazobactam (ZOSYN) 3.375 g in iso-osmotic dextrose 50 ml (premix), 3.375 g, Intravenous, Q8H, Josep Rao RPH, 3.375 g at 07/18/20 2328  •  sodium chloride 0.9 % flush 10 mL, 10 mL, Intravenous, Q12H, Radha Fraser APRN  •  sodium chloride 0.9 % flush 10 mL, 10 mL, Intravenous, PRN, Radha Fraser APRN  •  sodium chloride 0.9 % infusion, 100 mL/hr, Intravenous, Continuous, Radha Fraser APRN, Last Rate: 100 mL/hr at 07/19/20 0509, 100 mL/hr at 07/19/20 0509  •  traMADol (ULTRAM) tablet 50 mg, 50 mg, Oral, Q6H PRN, Radha Fraser APRN  •  vancomycin 1250 mg/250 mL 0.9% NS IVPB ([facility]), 1,250 mg, Intravenous, Q12H, Josep Rao RPH, 1,250 mg at 07/19/20 0509    Antibiotics:  Anti-Infectives (From admission, onward)    Ordered     Dose/Rate Route Frequency Start Stop    07/18/20 1907  vancomycin 1250 mg/250 mL 0.9% NS IVPB ([facility])  Review   Ordering Provider:  Josep Rao RPH    1,250 mg  over 60 Minutes Intravenous Every 12 Hours 07/19/20 0500 07/26/20 0459    07/18/20 1900  piperacillin-tazobactam (ZOSYN) 3.375 g in iso-osmotic dextrose 50 ml (premix)  Review   Note to Pharmacy:  First dose was given in ER today.  Please time from that dose.   Ordering Provider:  Josep Rao, [facility]    3.375 g  over 4 Hours Intravenous Every 8 Hours 07/19/20 0000 07/23/20 2359    07/18/20 1853  Pharmacy to dose vancomycin  Review   Ordering Provider:  Radha Fraser APRN     Does not apply Continuous PRN 07/18/20 1853 07/23/20 1852    07/18/20 1523  vancomycin 1750 mg/500 mL 0.9% NS IVPB ([facility])     Ordering Provider:  Golden Hedrick MD    20 mg/kg × 86.2  kg Intravenous Once 20 1525 20 1723    20 1523  piperacillin-tazobactam (ZOSYN) 3.375 g in iso-osmotic dextrose 50 ml (premix)     Ordering Provider:  Golden Hedrick MD    3.375 g Intravenous Once 20 1525 20 1723            Review of Systems:    Constitutional--positive for fevers trended down;  Appetite good, and no malaise. No fatigue.  HEENT-- No new vision, hearing or throat complaints.  No epistaxis or oral sores.  Denies odynophagia or dysphagia. No headache, photophobia or neck stiffness.  CV-- No chest pain, palpitation or syncope  Resp-- No SOB/cough/Hemoptysis  GI- No nausea, vomiting, or diarrhea.  No hematochezia, melena, or hematemesis. Denies jaundice or chronic liver disease.  --positive for right labial abscess.  Lymph- no swollen lymph nodes in neck/axilla or groin.   Heme- No active bruising or bleeding; no Hx of DVT or PE.  MS-- no swelling or pain in the bones or joints of arms/legs.  No new back pain.  Neuro-- No acute focal weakness or numbness in the arms or legs.  No seizures.  Skin--No rashes or lesions    All other systems negative for acute complaints on a 12 system ROS      Physical Exam:   Vital Signs  Temp (24hrs), Av.4 °F (38 °C), Min:98.8 °F (37.1 °C), Max:102.1 °F (38.9 °C)    Temp  Min: 98.8 °F (37.1 °C)  Max: 102.1 °F (38.9 °C)  BP  Min: 90/57  Max: 116/97  Pulse  Min: 75  Max: 112  Resp  Min: 17  Max: 20  SpO2  Min: 93 %  Max: 99 %    GENERAL: Awake and alert, in no acute distress.   HEENT: Normocephalic, atraumatic.  PERRL. EOMI. No conjunctival injection. No icterus. Oropharynx clear without evidence of thrush or exudate. No evidence of peridontal disease.    NECK: Supple without nuchal rigidity. No mass.  LYMPH: No cervical, axillary lymphadenopathy.  There is some inguinal lymphadenopathy on the right side.  HEART: RRR; No murmur, rubs, gallops.   LUNGS: Clear to auscultation bilaterally without wheezing, rales, rhonchi. Normal  respiratory effort. Nonlabored. No dullness.  ABDOMEN: Soft, nontender, nondistended. Positive bowel sounds. No rebound or guarding. NO mass or HSM.  EXT:  No cyanosis, clubbing or edema. No cord.  : Right labia is swollen 2-3+ edema with a small pinhole wound on the lower aspect of the labia.  The area is erythematous and is tender to touch.  Without Austin catheter. No crepitus or bullae; no fluctuance  MSK: FROM without joint effusions noted arms/legs.    SKIN: Warm and dry without cutaneous eruptions on Inspection/palpation.    NEURO: Oriented to PPT. No focal deficits on motor/sensory exam at arms/legs.  PSYCHIATRIC: Normal insight and judgement. Cooperative with PE    No peripheral stigmata/phenomena of IE    Laboratory Data    Results from last 7 days   Lab Units 07/18/20  1559   WBC 10*3/mm3 21.05*   HEMOGLOBIN g/dL 13.0   HEMATOCRIT % 37.7   PLATELETS 10*3/mm3 298     Results from last 7 days   Lab Units 07/18/20  1559   SODIUM mmol/L 134*   POTASSIUM mmol/L 3.8   CHLORIDE mmol/L 102   CO2 mmol/L 22.0   BUN mg/dL 11   CREATININE mg/dL 0.65   GLUCOSE mg/dL 89   CALCIUM mg/dL 8.8     Results from last 7 days   Lab Units 07/18/20  1559   ALK PHOS U/L 66   BILIRUBIN mg/dL 0.5   ALT (SGPT) U/L 12   AST (SGOT) U/L 15             Results from last 7 days   Lab Units 07/18/20  1559   LACTATE mmol/L 0.7             Estimated Creatinine Clearance: 131.1 mL/min (by C-G formula based on SCr of 0.65 mg/dL).      Microbiology:  Microbiology Results (last 10 days)     ** No results found for the last 240 hours. **              Radiology:  Imaging Results (Last 72 Hours)     Procedure Component Value Units Date/Time    XR Chest 1 View [[id number]] Collected:  07/18/20 1557     Updated:  07/18/20 1825    Narrative:          EXAMINATION: XR CHEST 1 VW - 07/18/2020     INDICATION: Fever      COMPARISON: NONE     FINDINGS: Portable chest reveals cardiac and mediastinal silhouettes  within normal limits. The lung fields are  clear. No focal parenchymal  opacification is present. No pleural effusion or pneumothorax. The bony  structures are unremarkable. The pulmonary vascularity is within normal  limits.         Impression:       No acute cardiopulmonary disease.     DICTATED:   2020  EDITED/ls :   2020                   Impression:   1. Acute Sepsis presenting with fever, leukocytosis, neutrophilia and labial abscess; sepsis parameters improving and no new focus of infection at present;  Monitor for other potential foci  2. Acute Right labial abscess- High risk for Gm+ organisms with Hx of hidradenitis  but keep broad coverage until further culture data; taper once more culture data  3. Polycystic ovarian syndrome; Hidradenitis  4. Red man syndrome- RN and patient reports that during the last 2 doses of vancomycin the patient has become erythematous, hot and felt like she is having an allergic reaction.  She was treated with Benadryl both times. Vancomycin stopped  5. Obesity  6. Remote history of tobacco abuse    PLAN/RECOMMENDATIONS:   Thank you for asking us to see Margarita Adair, I recommend the followin. daptomycin 4 mg/kg IV daily  3. Zosyn IV  4. Continue to follow cultures and sensitivity reports and adjust antibiotics accordingly  5. Obtain abscess cultures-these will be antibiotic modified at this point but are still worth obtaining  6. Continue supportive care    --check/ review labs, cultures and scans  --Hx per nursing  --d/w micro  --highly complex set of issues with high risk for further serious morbidity and other serious sequelae  --she will need to go back to dermatology for management of hidradenitis  --d/w Dr Shane Mccloud has obtained the history, performed the physical exam and formulated the above treatment plan.     David Dorsey, MARCELLO  2020  07:20                    Electronically signed by Fredrick Mccloud MD at 20 3597

## 2020-07-21 VITALS
BODY MASS INDEX: 30.53 KG/M2 | HEART RATE: 78 BPM | WEIGHT: 190 LBS | RESPIRATION RATE: 18 BRPM | SYSTOLIC BLOOD PRESSURE: 95 MMHG | DIASTOLIC BLOOD PRESSURE: 61 MMHG | TEMPERATURE: 99.1 F | HEIGHT: 66 IN | OXYGEN SATURATION: 96 %

## 2020-07-21 PROBLEM — A41.9 SEPSIS: Status: RESOLVED | Noted: 2020-07-18 | Resolved: 2020-07-21

## 2020-07-21 PROBLEM — D72.829 LEUKOCYTOSIS: Status: RESOLVED | Noted: 2020-07-19 | Resolved: 2020-07-21

## 2020-07-21 PROBLEM — R00.0 TACHYCARDIA: Status: RESOLVED | Noted: 2020-07-19 | Resolved: 2020-07-21

## 2020-07-21 PROBLEM — R50.9 FEVER: Status: RESOLVED | Noted: 2020-07-19 | Resolved: 2020-07-21

## 2020-07-21 LAB
BACTERIA SPEC AEROBE CULT: ABNORMAL
GRAM STN SPEC: ABNORMAL
GRAM STN SPEC: ABNORMAL

## 2020-07-21 PROCEDURE — 99239 HOSP IP/OBS DSCHRG MGMT >30: CPT | Performed by: NURSE PRACTITIONER

## 2020-07-21 PROCEDURE — 25010000002 DAPTOMYCIN PER 1 MG: Performed by: NURSE PRACTITIONER

## 2020-07-21 PROCEDURE — 25010000002 HEPARIN (PORCINE) PER 1000 UNITS: Performed by: FAMILY MEDICINE

## 2020-07-21 PROCEDURE — 25010000002 ERTAPENEM PER 500 MG: Performed by: INTERNAL MEDICINE

## 2020-07-21 PROCEDURE — 25010000002 ONDANSETRON PER 1 MG: Performed by: NURSE PRACTITIONER

## 2020-07-21 PROCEDURE — 25010000002 PIPERACILLIN SOD-TAZOBACTAM PER 1 G

## 2020-07-21 RX ORDER — SPIRONOLACTONE 100 MG/1
TABLET, FILM COATED ORAL
Start: 2020-07-21

## 2020-07-21 RX ORDER — TRAMADOL HYDROCHLORIDE 50 MG/1
50 TABLET ORAL EVERY 6 HOURS PRN
Qty: 12 TABLET | Refills: 0 | Status: SHIPPED | OUTPATIENT
Start: 2020-07-21 | End: 2021-05-06

## 2020-07-21 RX ORDER — ONDANSETRON 4 MG/1
4 TABLET, FILM COATED ORAL EVERY 6 HOURS PRN
Qty: 12 TABLET | Refills: 0 | Status: SHIPPED | OUTPATIENT
Start: 2020-07-21 | End: 2021-05-06

## 2020-07-21 RX ORDER — ACETAMINOPHEN 325 MG/1
650 TABLET ORAL EVERY 4 HOURS PRN
Start: 2020-07-21 | End: 2021-05-14 | Stop reason: HOSPADM

## 2020-07-21 RX ORDER — IBUPROFEN 600 MG/1
600 TABLET ORAL EVERY 6 HOURS PRN
Qty: 30 TABLET | Refills: 0 | Status: SHIPPED | OUTPATIENT
Start: 2020-07-21 | End: 2021-05-14 | Stop reason: HOSPADM

## 2020-07-21 RX ADMIN — HEPARIN SODIUM 5000 UNITS: 5000 INJECTION INTRAVENOUS; SUBCUTANEOUS at 08:13

## 2020-07-21 RX ADMIN — ERTAPENEM SODIUM 1 G: 1 INJECTION, POWDER, LYOPHILIZED, FOR SOLUTION INTRAMUSCULAR; INTRAVENOUS at 11:39

## 2020-07-21 RX ADMIN — DAPTOMYCIN 300 MG: 500 INJECTION, POWDER, LYOPHILIZED, FOR SOLUTION INTRAVENOUS at 10:47

## 2020-07-21 RX ADMIN — TAZOBACTAM SODIUM AND PIPERACILLIN SODIUM 3.38 G: 375; 3 INJECTION, SOLUTION INTRAVENOUS at 08:14

## 2020-07-21 RX ADMIN — ONDANSETRON 4 MG: 2 INJECTION INTRAMUSCULAR; INTRAVENOUS at 10:47

## 2020-07-21 NOTE — DISCHARGE SUMMARY
[facility] Medicine Services  DISCHARGE SUMMARY    Patient Name: Margarita Adair  : 1980  MRN: [id number]    Date of Admission: 2020  3:02 PM  Date of Discharge:  2020  Primary Care Physician: Nadira Pritchard APRN    Consults     Date and Time Order Name Status Description    2020 0030 Inpatient Infectious Diseases Consult Completed     2020 Inpatient Obstetrics / Gynecology Consult Completed           Hospital Course     Presenting Problem:   Sepsis (CMS/HCC) [A41.9]    Active Hospital Problems    Diagnosis  POA   • PCOS (polycystic ovarian syndrome) [E28.2]  Unknown   • Labial abscess [N76.4]  Unknown   • Overweight [E66.3]  Unknown      Resolved Hospital Problems    Diagnosis Date Resolved POA   • **Sepsis (CMS/HCC) [A41.9] 2020 Yes   • Fever [R50.9] 2020 Yes   • Leukocytosis [D72.829] 2020 Yes   • Tachycardia [R00.0] 2020 Yes          Hospital Course:  Margarita Adair is a 39 y.o. female with history of PCOS, hidradenitis presents with sepsis/ right labial abscess. She was seen in ER with lancing and antibiotics  and sent home. She returned for increasing pain, fever of 102, nausea and vomiting. She was admitted and [facility] and GYN consulted. Bedside I &D performed with packing and antibiotics, IVFs started.     Packing is removed and abscess significantly improved. Redness/ swelling near resolution and patient afebrile in past 48 hours. She will continue on daily infusions in [facility] with Invanz and Daptomycin following discharge. She will need follow up wit dermatology for eval/ treatment of hidradenitis.     Due to low BP, dehydration with n/v, spironolactone held. BP better and patient tolerating diet. Will continue to hold medication until follow up with PCP later this week.       Discharge Follow Up Recommendations for outpatient labs/diagnostics:  PCP follow up 1 week- holding spironolactone, deferred to PCP  [facility] daily  infusions starting 7/22 at 1515  GYN, Dr. Lynn f/u 2 weeks  Derm SUSI f/u 2-3 weeks    Day of Discharge     HPI:   Feeling better today. No overnight fever, chills. Rested well. Pain controlled. Swelling, redness improving.    Review of Systems  Gen- No fevers, chills  CV- No chest pain, palpitations  Resp- No cough, dyspnea  GI- No N/V/D, abd pain    Vital Signs:   Temp:  [97.9 °F (36.6 °C)-98.7 °F (37.1 °C)] 98.1 °F (36.7 °C)  Heart Rate:  [57-84] 84  Resp:  [16-18] 16  BP: ()/(47-81) 105/81     Physical Exam:  Constitutional: No acute distress, awake, alert, sitting up in bed  HENT: NCAT, mucous membranes moist  Respiratory: Clear to auscultation bilaterally, respiratory effort normal   Cardiovascular: RRR, no murmurs, rubs, or gallops, palpable pedal pulses bilaterally  Gastrointestinal: Positive bowel sounds, soft, nontender, nondistended  Musculoskeletal: No bilateral ankle edema  Psychiatric: Appropriate affect, cooperative  Neurologic: Oriented x 3, strength symmetric in all extremities, Cranial Nerves grossly intact to confrontation, speech clear  Skin: No rashes      Pertinent  and/or Most Recent Results     Results from last 7 days   Lab Units 07/20/20  0832 07/19/20  0651 07/18/20  1559   WBC 10*3/mm3 7.01 11.65* 21.05*   HEMOGLOBIN g/dL 12.0 11.5* 13.0   HEMATOCRIT % 36.7 35.0 37.7   PLATELETS 10*3/mm3 270 227 298   SODIUM mmol/L 138 137 134*   POTASSIUM mmol/L 3.8 3.6 3.8   CHLORIDE mmol/L 107 109* 102   CO2 mmol/L 21.0* 22.0 22.0   BUN mg/dL 6 5* 11   CREATININE mg/dL 0.58 0.56* 0.65   GLUCOSE mg/dL 169* 86 89   CALCIUM mg/dL 8.7 7.3* 8.8     Results from last 7 days   Lab Units 07/20/20  0832 07/18/20  1559   BILIRUBIN mg/dL 0.4 0.5   ALK PHOS U/L 75 66   ALT (SGPT) U/L 35* 12   AST (SGOT) U/L 35* 15           Invalid input(s): TG, LDLCALC, LDLREALC  Results from last 7 days   Lab Units 07/20/20  0832 07/19/20  0651 07/18/20  1559   PROCALCITONIN ng/mL 0.08  --  0.07   LACTATE mmol/L  --  0.6  0.7       Brief Urine Lab Results  (Last result in the past 365 days)      Color   Clarity   Blood   Leuk Est   Nitrite   Protein   CREAT   Urine HCG        07/18/20 1618 Yellow Clear Negative Negative Negative Negative               Microbiology Results Abnormal     Procedure Component Value - Date/Time    Wound Culture - Wound, Groin [[id number]]  (Abnormal) Collected:  07/19/20 0847    Lab Status:  Final result Specimen:  Wound from Groin Updated:  07/21/20 0740     Wound Culture Rare Bacillus species     Comment:   Most Bacillus species are susceptible to vancomycin, aminoglycosides, clindamycin, chloramphenicol, and erythromycin.        Gram Stain Rare (1+) WBCs seen      No organisms seen    Blood Culture - Blood, Arm, Right [[id number]] Collected:  07/18/20 1605    Lab Status:  Preliminary result Specimen:  Blood from Arm, Right Updated:  07/20/20 1630     Blood Culture No growth at 2 days    Blood Culture - Blood, Arm, Right [[id number]] Collected:  07/18/20 1559    Lab Status:  Preliminary result Specimen:  Blood from Arm, Right Updated:  07/20/20 1630     Blood Culture No growth at 2 days          Imaging Results (All)     Procedure Component Value Units Date/Time    XR Chest 1 View [[id number]] Collected:  07/18/20 1557     Updated:  07/19/20 1537    Narrative:          EXAMINATION: XR CHEST 1 VW - 07/18/2020     INDICATION: Fever      COMPARISON: NONE     FINDINGS: Portable chest reveals cardiac and mediastinal silhouettes  within normal limits. The lung fields are clear. No focal parenchymal  opacification is present. No pleural effusion or pneumothorax. The bony  structures are unremarkable. The pulmonary vascularity is within normal  limits.         Impression:       No acute cardiopulmonary disease.     DICTATED:   07/18/2020  EDITED/ls :   07/18/2020      This report was finalized on 7/19/2020 3:34 PM by Dr. Rocio Weiss MD.                            Plan for Follow-up of Pending Labs/Results:       Order Current Status    Anaerobic Culture - Swab, Groin In process    Blood Culture - Blood, Arm, Right Preliminary result    Blood Culture - Blood, Arm, Right Preliminary result        Discharge Details        Discharge Medications      New Medications      Instructions Start Date   acetaminophen 325 MG tablet  Commonly known as:  TYLENOL   650 mg, Oral, Every 4 Hours PRN      DAPTOmycin 300 mg in sodium chloride 0.9 % 50 mL   4 mg/kg (300 mg), Intravenous, Every 24 Hours      ertapenem 1 gm/100ml solution IV  Commonly known as:  INVanz   1 g, Intravenous, Every 24 Hours      ibuprofen 600 MG tablet  Commonly known as:  ADVIL,MOTRIN   600 mg, Oral, Every 6 Hours PRN      ondansetron 4 MG tablet  Commonly known as:  ZOFRAN   4 mg, Oral, Every 6 Hours PRN      traMADol 50 MG tablet  Commonly known as:  ULTRAM   50 mg, Oral, Every 6 Hours PRN         Changes to Medications      Instructions Start Date   spironolactone 100 MG tablet  Commonly known as:  ALDACTONE  What changed:    · how much to take  · additional instructions   Hold medication until follow up in office/ repeat BP check             Allergies   Allergen Reactions   • Oxycodone GI Intolerance         Discharge Disposition:  Home or Self Care    Diet:  Hospital:  Diet Order   Procedures   • Diet Regular       Activity:  Activity Instructions     Activity as Tolerated            Restrictions or Other Recommendations:         CODE STATUS:    Code Status and Medical Interventions:   Ordered at: 07/18/20 7376     Level Of Support Discussed With:    Patient     Code Status:    CPR     Medical Interventions (Level of Support Prior to Arrest):    Full       No future appointments.    Additional Instructions for the Follow-ups that You Need to Schedule     Discharge Follow-up with PCP   As directed       Currently Documented PCP:    Nadira Pritchard APRN    PCP Phone Number:    [phone]     Follow Up Details:  1 week         Discharge Follow-up with  Specified Provider: KATHLEEN Bear or PA- urgent/ next available 2-3 weeks if possible; 3 Weeks   As directed      To:  KATHLEEN Bear or PA- urgent/ next available 2-3 weeks if possible    Follow Up:  3 Weeks         Discharge Follow-up with Specified Provider: Dr. Lynn; 2 Weeks   As directed      To:  Dr. Lynn    Follow Up:  2 Weeks         Discharge Follow-up with Specified Provider: [facility] tomorrow 1515 as scheduled   As directed      To:  [facility] tomorrow 1515 as scheduled                     Electronically signed by MARCELLO Dan, 07/21/20, 10:17 AM.      Time Spent on Discharge:  I spent  45  minutes on this discharge activity which included: face-to-face encounter with the patient, reviewing the data in the system, coordination of the care with the nursing staff as well as consultants, documentation, and entering orders.

## 2020-07-21 NOTE — PAYOR COMM NOTE
"Safia Khan RN Utilization Review [phone]  Fax # [phone]  Ref # [id number]    Authorization still pending. Please note discharge date. Please call or fax with IP auth.     Margarita Quiroga (39 y.o. Female)     Date of Birth Social Security Number Address Home Phone MRN    1980  [facility] [place] [address] [phone] [id number]    Anabapt[employer] Marital Status          Faith        Admission Date Admission Type Admitting Provider Attending Provider Department, Room/Bed    20 Emergency Elena Campuzano MD Hall, [place], MD [facility] 2F, S215/    Discharge Date Discharge Disposition Discharge Destination         Home or Self Care              Attending Provider:  Elena Campuzano MD    Allergies:  Oxycodone    Isolation:  None   Infection:  None   Code Status:  CPR    Ht:  167.6 cm (66\")   Wt:  86.2 kg (190 lb)    Admission Cmt:  None   Principal Problem:  Sepsis (CMS/[facility]) [A41.9]                 Active Insurance as of 2020     Primary Coverage     Payor Plan Insurance Group Employer/Plan Group    [employer] BLUE [employer]OSS [employer] BLUE [employer] PPO [id number]     Payor Plan Address Payor Plan Phone Number Payor Plan Fax Number Effective Dates    [address] [phone]  2020 - None Entered    [facility] [id number]       Subscriber Name Subscriber Birth Date Member ID       SHELLY QUIROGA 1977 [id number]                 Emergency Contacts      (Rel.) Home Phone Work Phone Mobile Phone    SHELLY QUIROGA (Spouse) [phone] -- [phone]    JOSE PA (Mother) -- -- [phone]               Discharge Summary      Chelsea Irvin, APRN at [facility]              [facility] Medicine Services  DISCHARGE SUMMARY    Patient Name: Margarita Quiroga  : 1980  MRN: [id number]    Date of Admission: 2020  3:02 PM  Date of Discharge:  2020  Primary Care Physician: Nadira Pritchard, " APRN    Consults     Date and Time Order Name Status Description    7/19/2020 0030 Inpatient Infectious Diseases Consult Completed     7/18/2020 2046 Inpatient Obstetrics / Gynecology Consult Completed           Hospital Course     Presenting Problem:   Sepsis (CMS/HCC) [A41.9]    Active Hospital Problems    Diagnosis  POA   • PCOS (polycystic ovarian syndrome) [E28.2]  Unknown   • Labial abscess [N76.4]  Unknown   • Overweight [E66.3]  Unknown      Resolved Hospital Problems    Diagnosis Date Resolved POA   • **Sepsis (CMS/HCC) [A41.9] 07/21/2020 Yes   • Fever [R50.9] 07/21/2020 Yes   • Leukocytosis [D72.829] 07/21/2020 Yes   • Tachycardia [R00.0] 07/21/2020 Yes          Hospital Course:  Margarita Adair is a 39 y.o. female with history of PCOS, hidradenitis presents with sepsis/ right labial abscess. She was seen in ER with lancing and antibiotics 7/18 and sent home. She returned for increasing pain, fever of 102, nausea and vomiting. She was admitted and [facility] and GYN consulted. Bedside I &D performed with packing and antibiotics, IVFs started.     Packing is removed and abscess significantly improved. Redness/ swelling near resolution and patient afebrile in past 48 hours. She will continue on daily infusions in [facility] with Invanz and Daptomycin following discharge. She will need follow up wit dermatology for eval/ treatment of hidradenitis.     Due to low BP, dehydration with n/v, spironolactone held. BP better and patient tolerating diet. Will continue to hold medication until follow up with PCP later this week.       Discharge Follow Up Recommendations for outpatient labs/diagnostics:  PCP follow up 1 week- holding spironolactone, deferred to PCP  [facility] daily infusions starting 7/22 at 1515  GYN, Dr. Lynn f/u 2 weeks  Derm [facility] f/u 2-3 weeks    Day of Discharge     HPI:   Feeling better today. No overnight fever, chills. Rested well. Pain controlled. Swelling, redness improving.    Review of Systems  Gen- No  fevers, chills  CV- No chest pain, palpitations  Resp- No cough, dyspnea  GI- No N/V/D, abd pain    Vital Signs:   Temp:  [97.9 °F (36.6 °C)-98.7 °F (37.1 °C)] 98.1 °F (36.7 °C)  Heart Rate:  [57-84] 84  Resp:  [16-18] 16  BP: ()/(47-81) 105/81     Physical Exam:  Constitutional: No acute distress, awake, alert, sitting up in bed  HENT: NCAT, mucous membranes moist  Respiratory: Clear to auscultation bilaterally, respiratory effort normal   Cardiovascular: RRR, no murmurs, rubs, or gallops, palpable pedal pulses bilaterally  Gastrointestinal: Positive bowel sounds, soft, nontender, nondistended  Musculoskeletal: No bilateral ankle edema  Psychiatric: Appropriate affect, cooperative  Neurologic: Oriented x 3, strength symmetric in all extremities, Cranial Nerves grossly intact to confrontation, speech clear  Skin: No rashes      Pertinent  and/or Most Recent Results     Results from last 7 days   Lab Units 07/20/20  0832 07/19/20  0651 07/18/20  1559   WBC 10*3/mm3 7.01 11.65* 21.05*   HEMOGLOBIN g/dL 12.0 11.5* 13.0   HEMATOCRIT % 36.7 35.0 37.7   PLATELETS 10*3/mm3 270 227 298   SODIUM mmol/L 138 137 134*   POTASSIUM mmol/L 3.8 3.6 3.8   CHLORIDE mmol/L 107 109* 102   CO2 mmol/L 21.0* 22.0 22.0   BUN mg/dL 6 5* 11   CREATININE mg/dL 0.58 0.56* 0.65   GLUCOSE mg/dL 169* 86 89   CALCIUM mg/dL 8.7 7.3* 8.8     Results from last 7 days   Lab Units 07/20/20  0832 07/18/20  1559   BILIRUBIN mg/dL 0.4 0.5   ALK PHOS U/L 75 66   ALT (SGPT) U/L 35* 12   AST (SGOT) U/L 35* 15           Invalid input(s): TG, LDLCALC, LDLREALC  Results from last 7 days   Lab Units 07/20/20  0832 07/19/20  0651 07/18/20  1559   PROCALCITONIN ng/mL 0.08  --  0.07   LACTATE mmol/L  --  0.6 0.7       Brief Urine Lab Results  (Last result in the past 365 days)      Color   Clarity   Blood   Leuk Est   Nitrite   Protein   CREAT   Urine HCG        07/18/20 1618 Yellow Clear Negative Negative Negative Negative               Microbiology Results  Abnormal     Procedure Component Value - Date/Time    Wound Culture - Wound, Groin [[id number]]  (Abnormal) Collected:  07/19/20 0847    Lab Status:  Final result Specimen:  Wound from Groin Updated:  07/21/20 0740     Wound Culture Rare Bacillus species     Comment:   Most Bacillus species are susceptible to vancomycin, aminoglycosides, clindamycin, chloramphenicol, and erythromycin.        Gram Stain Rare (1+) WBCs seen      No organisms seen    Blood Culture - Blood, Arm, Right [[id number]] Collected:  07/18/20 1605    Lab Status:  Preliminary result Specimen:  Blood from Arm, Right Updated:  07/20/20 1630     Blood Culture No growth at 2 days    Blood Culture - Blood, Arm, Right [[id number]] Collected:  07/18/20 1559    Lab Status:  Preliminary result Specimen:  Blood from Arm, Right Updated:  07/20/20 1630     Blood Culture No growth at 2 days          Imaging Results (All)     Procedure Component Value Units Date/Time    XR Chest 1 View [[id number]] Collected:  07/18/20 1557     Updated:  07/19/20 1537    Narrative:          EXAMINATION: XR CHEST 1 VW - 07/18/2020     INDICATION: Fever      COMPARISON: NONE     FINDINGS: Portable chest reveals cardiac and mediastinal silhouettes  within normal limits. The lung fields are clear. No focal parenchymal  opacification is present. No pleural effusion or pneumothorax. The bony  structures are unremarkable. The pulmonary vascularity is within normal  limits.         Impression:       No acute cardiopulmonary disease.     DICTATED:   07/18/2020  EDITED/ls :   07/18/2020      This report was finalized on 7/19/2020 3:34 PM by Dr. Rocio Weiss MD.                            Plan for Follow-up of Pending Labs/Results:    Order Current Status    Anaerobic Culture - Swab, Groin In process    Blood Culture - Blood, Arm, Right Preliminary result    Blood Culture - Blood, Arm, Right Preliminary result        Discharge Details        Discharge Medications      New Medications       Instructions Start Date   acetaminophen 325 MG tablet  Commonly known as:  TYLENOL   650 mg, Oral, Every 4 Hours PRN      DAPTOmycin 300 mg in sodium chloride 0.9 % 50 mL   4 mg/kg (300 mg), Intravenous, Every 24 Hours      ertapenem 1 gm/100ml solution IV  Commonly known as:  INVanz   1 g, Intravenous, Every 24 Hours      ibuprofen 600 MG tablet  Commonly known as:  ADVIL,MOTRIN   600 mg, Oral, Every 6 Hours PRN      ondansetron 4 MG tablet  Commonly known as:  ZOFRAN   4 mg, Oral, Every 6 Hours PRN      traMADol 50 MG tablet  Commonly known as:  ULTRAM   50 mg, Oral, Every 6 Hours PRN         Changes to Medications      Instructions Start Date   spironolactone 100 MG tablet  Commonly known as:  ALDACTONE  What changed:    · how much to take  · additional instructions   Hold medication until follow up in office/ repeat BP check             Allergies   Allergen Reactions   • Oxycodone GI Intolerance         Discharge Disposition:  Home or Self Care    Diet:  Hospital:  Diet Order   Procedures   • Diet Regular       Activity:  Activity Instructions     Activity as Tolerated            Restrictions or Other Recommendations:         CODE STATUS:    Code Status and Medical Interventions:   Ordered at: 07/18/20 5095     Level Of Support Discussed With:    Patient     Code Status:    CPR     Medical Interventions (Level of Support Prior to Arrest):    Full       No future appointments.    Additional Instructions for the Follow-ups that You Need to Schedule     Discharge Follow-up with PCP   As directed       Currently Documented PCP:    Nadira Pritchard APRN    PCP Phone Number:    [phone]     Follow Up Details:  1 week         Discharge Follow-up with Specified Provider: KATHLEEN Bear or PA- urgent/ next available 2-3 weeks if possible; 3 Weeks   As directed      To:  KATHLEEN Bear or PA- urgent/ next available 2-3 weeks if possible    Follow Up:  3 Weeks         Discharge Follow-up with Specified  Provider: Dr. Lynn; 2 Weeks   As directed      To:  Dr. Lynn    Follow Up:  2 Weeks         Discharge Follow-up with Specified Provider: [facility] tomorrow 1515 as scheduled   As directed      To:  [facility] tomorrow 1515 as scheduled                     Electronically signed by MARCELLO Dan, 07/21/20, 10:17 AM.      Time Spent on Discharge:  I spent  45  minutes on this discharge activity which included: face-to-face encounter with the patient, reviewing the data in the system, coordination of the care with the nursing staff as well as consultants, documentation, and entering orders.            Electronically signed by Chelsea Irvin APRN at 07/21/20 1026       Discharge Order (From admission, onward)     Start     Ordered    07/21/20 1012  Discharge patient  Once     Expected Discharge Date:  07/21/20    Discharge Disposition:  Home or Self Care    Physician of Record for Attribution - Please select from Treatment Team:  MELINDA VELAZQUEZ [[id number]]    Review needed by CMO to determine Physician of Record:  No       Question Answer Comment   Physician of Record for Attribution - Please select from Treatment Team MELINDA VELAZQUEZ    Review needed by CMO to determine Physician of Record No        07/21/20 1016

## 2020-07-21 NOTE — PROGRESS NOTES
Continued Stay Note  [facility]     Patient Name: Margarita Adair  MRN: [id number]  Today's Date: 7/21/2020    Admit Date: 7/18/2020    Discharge Plan     Row Name 07/21/20 0916       Plan    Plan Comments  CM received call from Patty at [facility] office, pt will have daily infusions with them and Dr. Mccloud has already discussed this with pt and she is agreeable. Pt will have appointment for follow  up and infusion Wednesday 07/22/2020 @ 1515 at [facility] office. No further needs noted at this time.         Discharge Codes    No documentation.             Delmi Li RN

## 2020-07-21 NOTE — PLAN OF CARE
Pt expressing readiness  to go home. Wants to go to infusion clinic daily for 7 days.  will P/U apx 1400.

## 2020-07-21 NOTE — PLAN OF CARE
Problem: Patient Care Overview  Goal: Plan of Care Review  Outcome: Ongoing (interventions implemented as appropriate)  Flowsheets (Taken 7/21/2020 0254)  Progress: improving  Plan of Care Reviewed With: patient  Outcome Summary: VSS. RA. Scab over abscess sight. Pt complains of pain, prn meds given. Pt is resting well. Will continue to monitor.

## 2020-07-23 LAB
BACTERIA SPEC AEROBE CULT: NORMAL
BACTERIA SPEC AEROBE CULT: NORMAL

## 2020-07-24 LAB — BACTERIA SPEC ANAEROBE CULT: ABNORMAL

## 2020-10-29 ENCOUNTER — TRANSCRIBE ORDERS (OUTPATIENT)
Dept: NUTRITION | Facility: HOSPITAL | Age: 40
End: 2020-10-29

## 2020-10-29 DIAGNOSIS — R63.5 ABNORMAL WEIGHT GAIN: Primary | ICD-10-CM

## 2021-04-29 ENCOUNTER — PREP FOR SURGERY (OUTPATIENT)
Dept: OTHER | Facility: HOSPITAL | Age: 41
End: 2021-04-29

## 2021-04-29 DIAGNOSIS — N92.4 EXCESSIVE BLEEDING IN PREMENOPAUSAL PERIOD: Primary | ICD-10-CM

## 2021-04-29 PROBLEM — N92.0 MENORRHAGIA: Status: ACTIVE | Noted: 2021-04-29

## 2021-04-29 RX ORDER — HEPARIN SODIUM 5000 [USP'U]/ML
5000 INJECTION, SOLUTION INTRAVENOUS; SUBCUTANEOUS ONCE
Status: CANCELLED | OUTPATIENT
Start: 2021-04-29 | End: 2021-04-29

## 2021-04-29 RX ORDER — CEFAZOLIN SODIUM 2 G/100ML
2 INJECTION, SOLUTION INTRAVENOUS ONCE
Status: CANCELLED | OUTPATIENT
Start: 2021-04-29 | End: 2021-04-29

## 2021-04-29 RX ORDER — ACETAMINOPHEN 500 MG
1000 TABLET ORAL ONCE
Status: CANCELLED | OUTPATIENT
Start: 2021-04-29 | End: 2021-04-29

## 2021-04-29 RX ORDER — SODIUM CHLORIDE 0.9 % (FLUSH) 0.9 %
3 SYRINGE (ML) INJECTION EVERY 12 HOURS SCHEDULED
Status: CANCELLED | OUTPATIENT
Start: 2021-04-29

## 2021-04-29 RX ORDER — GABAPENTIN 300 MG/1
600 CAPSULE ORAL ONCE
Status: CANCELLED | OUTPATIENT
Start: 2021-04-29 | End: 2021-04-29

## 2021-04-29 RX ORDER — SODIUM CHLORIDE 0.9 % (FLUSH) 0.9 %
10 SYRINGE (ML) INJECTION AS NEEDED
Status: CANCELLED | OUTPATIENT
Start: 2021-04-29

## 2021-05-06 ENCOUNTER — PRE-ADMISSION TESTING (OUTPATIENT)
Dept: PREADMISSION TESTING | Facility: HOSPITAL | Age: 41
End: 2021-05-06

## 2021-05-06 ENCOUNTER — APPOINTMENT (OUTPATIENT)
Dept: PREADMISSION TESTING | Facility: HOSPITAL | Age: 41
End: 2021-05-06

## 2021-05-06 VITALS — HEIGHT: 66 IN | BODY MASS INDEX: 30.29 KG/M2 | WEIGHT: 188.49 LBS

## 2021-05-06 DIAGNOSIS — N92.4 EXCESSIVE BLEEDING IN PREMENOPAUSAL PERIOD: ICD-10-CM

## 2021-05-06 LAB
B-HCG UR QL: NEGATIVE
BASOPHILS # BLD AUTO: 0.06 10*3/MM3 (ref 0–0.2)
BASOPHILS NFR BLD AUTO: 0.5 % (ref 0–1.5)
BILIRUB UR QL STRIP: NEGATIVE
CLARITY UR: CLEAR
COLOR UR: YELLOW
DEPRECATED RDW RBC AUTO: 38.3 FL (ref 37–54)
EOSINOPHIL # BLD AUTO: 0.17 10*3/MM3 (ref 0–0.4)
EOSINOPHIL NFR BLD AUTO: 1.5 % (ref 0.3–6.2)
ERYTHROCYTE [DISTWIDTH] IN BLOOD BY AUTOMATED COUNT: 11.9 % (ref 12.3–15.4)
GLUCOSE UR STRIP-MCNC: NEGATIVE MG/DL
HCT VFR BLD AUTO: 39.9 % (ref 34–46.6)
HGB BLD-MCNC: 14 G/DL (ref 12–15.9)
HGB UR QL STRIP.AUTO: NEGATIVE
IMM GRANULOCYTES # BLD AUTO: 0.03 10*3/MM3 (ref 0–0.05)
IMM GRANULOCYTES NFR BLD AUTO: 0.3 % (ref 0–0.5)
KETONES UR QL STRIP: NEGATIVE
LEUKOCYTE ESTERASE UR QL STRIP.AUTO: NEGATIVE
LYMPHOCYTES # BLD AUTO: 3.8 10*3/MM3 (ref 0.7–3.1)
LYMPHOCYTES NFR BLD AUTO: 32.9 % (ref 19.6–45.3)
MCH RBC QN AUTO: 31.2 PG (ref 26.6–33)
MCHC RBC AUTO-ENTMCNC: 35.1 G/DL (ref 31.5–35.7)
MCV RBC AUTO: 88.9 FL (ref 79–97)
MONOCYTES # BLD AUTO: 0.84 10*3/MM3 (ref 0.1–0.9)
MONOCYTES NFR BLD AUTO: 7.3 % (ref 5–12)
NEUTROPHILS NFR BLD AUTO: 57.5 % (ref 42.7–76)
NEUTROPHILS NFR BLD AUTO: 6.64 10*3/MM3 (ref 1.7–7)
NITRITE UR QL STRIP: NEGATIVE
NRBC BLD AUTO-RTO: 0 /100 WBC (ref 0–0.2)
PH UR STRIP.AUTO: 6 [PH] (ref 5–8)
PLATELET # BLD AUTO: 390 10*3/MM3 (ref 140–450)
PMV BLD AUTO: 9.5 FL (ref 6–12)
PROT UR QL STRIP: NEGATIVE
RBC # BLD AUTO: 4.49 10*6/MM3 (ref 3.77–5.28)
SP GR UR STRIP: 1.02 (ref 1–1.03)
UROBILINOGEN UR QL STRIP: NORMAL
WBC # BLD AUTO: 11.54 10*3/MM3 (ref 3.4–10.8)

## 2021-05-06 PROCEDURE — 85025 COMPLETE CBC W/AUTO DIFF WBC: CPT

## 2021-05-06 PROCEDURE — 81025 URINE PREGNANCY TEST: CPT

## 2021-05-06 PROCEDURE — 81003 URINALYSIS AUTO W/O SCOPE: CPT

## 2021-05-06 PROCEDURE — 36415 COLL VENOUS BLD VENIPUNCTURE: CPT

## 2021-05-06 RX ORDER — PHENTERMINE HYDROCHLORIDE 15 MG/1
15 CAPSULE ORAL EVERY MORNING
COMMUNITY
End: 2021-05-14 | Stop reason: HOSPADM

## 2021-05-10 ENCOUNTER — APPOINTMENT (OUTPATIENT)
Dept: PREADMISSION TESTING | Facility: HOSPITAL | Age: 41
End: 2021-05-10

## 2021-05-10 PROCEDURE — C9803 HOPD COVID-19 SPEC COLLECT: HCPCS

## 2021-05-10 PROCEDURE — U0004 COV-19 TEST NON-CDC HGH THRU: HCPCS

## 2021-05-11 ENCOUNTER — APPOINTMENT (OUTPATIENT)
Dept: PREADMISSION TESTING | Facility: HOSPITAL | Age: 41
End: 2021-05-11

## 2021-05-11 LAB — SARS-COV-2 RNA PNL SPEC NAA+PROBE: NOT DETECTED

## 2021-05-12 ENCOUNTER — ANESTHESIA EVENT (OUTPATIENT)
Dept: PERIOP | Facility: HOSPITAL | Age: 41
End: 2021-05-12

## 2021-05-12 RX ORDER — FAMOTIDINE 10 MG/ML
20 INJECTION, SOLUTION INTRAVENOUS ONCE
Status: CANCELLED | OUTPATIENT
Start: 2021-05-12 | End: 2021-05-12

## 2021-05-13 ENCOUNTER — ANESTHESIA (OUTPATIENT)
Dept: PERIOP | Facility: HOSPITAL | Age: 41
End: 2021-05-13

## 2021-05-13 ENCOUNTER — HOSPITAL ENCOUNTER (OUTPATIENT)
Facility: HOSPITAL | Age: 41
Discharge: HOME | End: 2021-05-14
Attending: OBSTETRICS & GYNECOLOGY | Admitting: OBSTETRICS & GYNECOLOGY

## 2021-05-13 DIAGNOSIS — N92.0 MENORRHAGIA: Primary | ICD-10-CM

## 2021-05-13 DIAGNOSIS — N92.4 EXCESSIVE BLEEDING IN PREMENOPAUSAL PERIOD: ICD-10-CM

## 2021-05-13 LAB
B-HCG UR QL: NEGATIVE
INTERNAL NEGATIVE CONTROL: NEGATIVE
INTERNAL POSITIVE CONTROL: POSITIVE
Lab: NORMAL
POTASSIUM SERPL-SCNC: 4 MMOL/L (ref 3.5–5.2)

## 2021-05-13 PROCEDURE — 25010000002 FENTANYL CITRATE (PF) 50 MCG/ML SOLUTION: Performed by: NURSE ANESTHETIST, CERTIFIED REGISTERED

## 2021-05-13 PROCEDURE — 25010000002 DROPERIDOL PER 5 MG: Performed by: NURSE ANESTHETIST, CERTIFIED REGISTERED

## 2021-05-13 PROCEDURE — 25010000002 FENTANYL CITRATE (PF) 100 MCG/2ML SOLUTION: Performed by: NURSE ANESTHETIST, CERTIFIED REGISTERED

## 2021-05-13 PROCEDURE — 25010000002 HYDROMORPHONE PER 4 MG: Performed by: NURSE ANESTHETIST, CERTIFIED REGISTERED

## 2021-05-13 PROCEDURE — 25010000003 CEFAZOLIN IN DEXTROSE 2-4 GM/100ML-% SOLUTION: Performed by: OBSTETRICS & GYNECOLOGY

## 2021-05-13 PROCEDURE — 81025 URINE PREGNANCY TEST: CPT | Performed by: ANESTHESIOLOGY

## 2021-05-13 PROCEDURE — 25010000002 PROPOFOL 10 MG/ML EMULSION: Performed by: NURSE ANESTHETIST, CERTIFIED REGISTERED

## 2021-05-13 PROCEDURE — 25010000002 HEPARIN (PORCINE) PER 1000 UNITS: Performed by: OBSTETRICS & GYNECOLOGY

## 2021-05-13 PROCEDURE — 84132 ASSAY OF SERUM POTASSIUM: CPT | Performed by: ANESTHESIOLOGY

## 2021-05-13 PROCEDURE — G0378 HOSPITAL OBSERVATION PER HR: HCPCS

## 2021-05-13 PROCEDURE — 25010000002 HYDROMORPHONE PER 4 MG: Performed by: OBSTETRICS & GYNECOLOGY

## 2021-05-13 PROCEDURE — 25010000002 NEOSTIGMINE 10 MG/10ML SOLUTION: Performed by: NURSE ANESTHETIST, CERTIFIED REGISTERED

## 2021-05-13 PROCEDURE — 25010000002 ONDANSETRON PER 1 MG: Performed by: NURSE ANESTHETIST, CERTIFIED REGISTERED

## 2021-05-13 PROCEDURE — 25010000002 DEXAMETHASONE PER 1 MG: Performed by: NURSE ANESTHETIST, CERTIFIED REGISTERED

## 2021-05-13 PROCEDURE — 25010000002 ONDANSETRON PER 1 MG: Performed by: OBSTETRICS & GYNECOLOGY

## 2021-05-13 PROCEDURE — 88307 TISSUE EXAM BY PATHOLOGIST: CPT | Performed by: OBSTETRICS & GYNECOLOGY

## 2021-05-13 RX ORDER — SODIUM CHLORIDE 0.9 % (FLUSH) 0.9 %
10 SYRINGE (ML) INJECTION EVERY 12 HOURS SCHEDULED
Status: DISCONTINUED | OUTPATIENT
Start: 2021-05-13 | End: 2021-05-13 | Stop reason: HOSPADM

## 2021-05-13 RX ORDER — ONDANSETRON 2 MG/ML
INJECTION INTRAMUSCULAR; INTRAVENOUS AS NEEDED
Status: DISCONTINUED | OUTPATIENT
Start: 2021-05-13 | End: 2021-05-13 | Stop reason: SURG

## 2021-05-13 RX ORDER — IPRATROPIUM BROMIDE AND ALBUTEROL SULFATE 2.5; .5 MG/3ML; MG/3ML
3 SOLUTION RESPIRATORY (INHALATION) ONCE AS NEEDED
Status: DISCONTINUED | OUTPATIENT
Start: 2021-05-13 | End: 2021-05-13 | Stop reason: HOSPADM

## 2021-05-13 RX ORDER — CHOLECALCIFEROL (VITAMIN D3) 125 MCG
2.5 CAPSULE ORAL NIGHTLY PRN
Status: DISCONTINUED | OUTPATIENT
Start: 2021-05-13 | End: 2021-05-14 | Stop reason: HOSPADM

## 2021-05-13 RX ORDER — LIDOCAINE HYDROCHLORIDE 10 MG/ML
0.5 INJECTION, SOLUTION EPIDURAL; INFILTRATION; INTRACAUDAL; PERINEURAL ONCE AS NEEDED
Status: DISCONTINUED | OUTPATIENT
Start: 2021-05-13 | End: 2021-05-13 | Stop reason: HOSPADM

## 2021-05-13 RX ORDER — HYDROMORPHONE HYDROCHLORIDE 1 MG/ML
0.5 INJECTION, SOLUTION INTRAMUSCULAR; INTRAVENOUS; SUBCUTANEOUS
Status: DISCONTINUED | OUTPATIENT
Start: 2021-05-13 | End: 2021-05-13 | Stop reason: HOSPADM

## 2021-05-13 RX ORDER — SODIUM CHLORIDE 0.9 % (FLUSH) 0.9 %
3 SYRINGE (ML) INJECTION EVERY 12 HOURS SCHEDULED
Status: DISCONTINUED | OUTPATIENT
Start: 2021-05-13 | End: 2021-05-13 | Stop reason: HOSPADM

## 2021-05-13 RX ORDER — FENTANYL CITRATE 50 UG/ML
INJECTION, SOLUTION INTRAMUSCULAR; INTRAVENOUS AS NEEDED
Status: DISCONTINUED | OUTPATIENT
Start: 2021-05-13 | End: 2021-05-13 | Stop reason: SURG

## 2021-05-13 RX ORDER — MEPERIDINE HYDROCHLORIDE 25 MG/ML
12.5 INJECTION INTRAMUSCULAR; INTRAVENOUS; SUBCUTANEOUS
Status: DISCONTINUED | OUTPATIENT
Start: 2021-05-13 | End: 2021-05-13 | Stop reason: HOSPADM

## 2021-05-13 RX ORDER — DROPERIDOL 2.5 MG/ML
0.62 INJECTION, SOLUTION INTRAMUSCULAR; INTRAVENOUS AS NEEDED
Status: DISCONTINUED | OUTPATIENT
Start: 2021-05-13 | End: 2021-05-13 | Stop reason: HOSPADM

## 2021-05-13 RX ORDER — HYDRALAZINE HYDROCHLORIDE 20 MG/ML
5 INJECTION INTRAMUSCULAR; INTRAVENOUS
Status: DISCONTINUED | OUTPATIENT
Start: 2021-05-13 | End: 2021-05-13 | Stop reason: HOSPADM

## 2021-05-13 RX ORDER — POLYETHYLENE GLYCOL 3350 17 G/17G
17 POWDER, FOR SOLUTION ORAL DAILY
Qty: 225 G | Refills: 0 | Status: SHIPPED | OUTPATIENT
Start: 2021-05-13 | End: 2022-09-12

## 2021-05-13 RX ORDER — CHOLECALCIFEROL (VITAMIN D3) 125 MCG
5 CAPSULE ORAL
COMMUNITY

## 2021-05-13 RX ORDER — ONDANSETRON 2 MG/ML
4 INJECTION INTRAMUSCULAR; INTRAVENOUS ONCE AS NEEDED
Status: COMPLETED | OUTPATIENT
Start: 2021-05-13 | End: 2021-05-13

## 2021-05-13 RX ORDER — SODIUM CHLORIDE, SODIUM LACTATE, POTASSIUM CHLORIDE, CALCIUM CHLORIDE 600; 310; 30; 20 MG/100ML; MG/100ML; MG/100ML; MG/100ML
9 INJECTION, SOLUTION INTRAVENOUS CONTINUOUS
Status: DISCONTINUED | OUTPATIENT
Start: 2021-05-13 | End: 2021-05-14 | Stop reason: HOSPADM

## 2021-05-13 RX ORDER — GLYCOPYRROLATE 0.2 MG/ML
INJECTION INTRAMUSCULAR; INTRAVENOUS AS NEEDED
Status: DISCONTINUED | OUTPATIENT
Start: 2021-05-13 | End: 2021-05-13 | Stop reason: SURG

## 2021-05-13 RX ORDER — HYDROCODONE BITARTRATE AND ACETAMINOPHEN 5; 300 MG/1; MG/1
1 TABLET ORAL EVERY 6 HOURS PRN
Qty: 20 TABLET | Refills: 0 | Status: SHIPPED | OUTPATIENT
Start: 2021-05-13 | End: 2022-09-12

## 2021-05-13 RX ORDER — HYDROCODONE BITARTRATE AND ACETAMINOPHEN 5; 325 MG/1; MG/1
1 TABLET ORAL EVERY 6 HOURS PRN
Qty: 20 TABLET | Refills: 0 | Status: SHIPPED | OUTPATIENT
Start: 2021-05-13 | End: 2022-09-12

## 2021-05-13 RX ORDER — ROCURONIUM BROMIDE 10 MG/ML
INJECTION, SOLUTION INTRAVENOUS AS NEEDED
Status: DISCONTINUED | OUTPATIENT
Start: 2021-05-13 | End: 2021-05-13 | Stop reason: SURG

## 2021-05-13 RX ORDER — MELOXICAM 7.5 MG/1
7.5 TABLET ORAL ONCE AS NEEDED
Status: COMPLETED | OUTPATIENT
Start: 2021-05-13 | End: 2021-05-14

## 2021-05-13 RX ORDER — GABAPENTIN 300 MG/1
600 CAPSULE ORAL ONCE
Status: COMPLETED | OUTPATIENT
Start: 2021-05-13 | End: 2021-05-13

## 2021-05-13 RX ORDER — ONDANSETRON 2 MG/ML
4 INJECTION INTRAMUSCULAR; INTRAVENOUS ONCE AS NEEDED
Status: DISCONTINUED | OUTPATIENT
Start: 2021-05-13 | End: 2021-05-13 | Stop reason: HOSPADM

## 2021-05-13 RX ORDER — HYDROMORPHONE HYDROCHLORIDE 1 MG/ML
0.5 INJECTION, SOLUTION INTRAMUSCULAR; INTRAVENOUS; SUBCUTANEOUS
Status: DISCONTINUED | OUTPATIENT
Start: 2021-05-13 | End: 2021-05-14 | Stop reason: HOSPADM

## 2021-05-13 RX ORDER — DROPERIDOL 2.5 MG/ML
0.62 INJECTION, SOLUTION INTRAMUSCULAR; INTRAVENOUS ONCE AS NEEDED
Status: DISCONTINUED | OUTPATIENT
Start: 2021-05-13 | End: 2021-05-13 | Stop reason: HOSPADM

## 2021-05-13 RX ORDER — SODIUM CHLORIDE 0.9 % (FLUSH) 0.9 %
10 SYRINGE (ML) INJECTION AS NEEDED
Status: DISCONTINUED | OUTPATIENT
Start: 2021-05-13 | End: 2021-05-13 | Stop reason: HOSPADM

## 2021-05-13 RX ORDER — SODIUM CHLORIDE 9 MG/ML
INJECTION, SOLUTION INTRAVENOUS AS NEEDED
Status: DISCONTINUED | OUTPATIENT
Start: 2021-05-13 | End: 2021-05-13 | Stop reason: HOSPADM

## 2021-05-13 RX ORDER — POLYETHYLENE GLYCOL 3350 17 G/17G
17 POWDER, FOR SOLUTION ORAL DAILY
Status: DISCONTINUED | OUTPATIENT
Start: 2021-05-13 | End: 2021-05-14 | Stop reason: HOSPADM

## 2021-05-13 RX ORDER — ACETAMINOPHEN 325 MG/1
650 TABLET ORAL EVERY 4 HOURS PRN
Qty: 100 TABLET | Refills: 0 | Status: SHIPPED | OUTPATIENT
Start: 2021-05-13 | End: 2022-09-12

## 2021-05-13 RX ORDER — PROPOFOL 10 MG/ML
VIAL (ML) INTRAVENOUS AS NEEDED
Status: DISCONTINUED | OUTPATIENT
Start: 2021-05-13 | End: 2021-05-13 | Stop reason: SURG

## 2021-05-13 RX ORDER — MIDAZOLAM HYDROCHLORIDE 1 MG/ML
1 INJECTION INTRAMUSCULAR; INTRAVENOUS
Status: DISCONTINUED | OUTPATIENT
Start: 2021-05-13 | End: 2021-05-13 | Stop reason: HOSPADM

## 2021-05-13 RX ORDER — ACETAMINOPHEN 325 MG/1
650 TABLET ORAL ONCE
Status: DISCONTINUED | OUTPATIENT
Start: 2021-05-13 | End: 2021-05-14 | Stop reason: HOSPADM

## 2021-05-13 RX ORDER — PROMETHAZINE HYDROCHLORIDE 25 MG/1
25 SUPPOSITORY RECTAL ONCE AS NEEDED
Status: DISCONTINUED | OUTPATIENT
Start: 2021-05-13 | End: 2021-05-13 | Stop reason: HOSPADM

## 2021-05-13 RX ORDER — LABETALOL HYDROCHLORIDE 5 MG/ML
5 INJECTION, SOLUTION INTRAVENOUS
Status: DISCONTINUED | OUTPATIENT
Start: 2021-05-13 | End: 2021-05-13 | Stop reason: HOSPADM

## 2021-05-13 RX ORDER — SODIUM CHLORIDE 0.9 % (FLUSH) 0.9 %
3-10 SYRINGE (ML) INJECTION AS NEEDED
Status: DISCONTINUED | OUTPATIENT
Start: 2021-05-13 | End: 2021-05-13 | Stop reason: HOSPADM

## 2021-05-13 RX ORDER — KETAMINE HCL IN NACL, ISO-OSM 100MG/10ML
SYRINGE (ML) INJECTION AS NEEDED
Status: DISCONTINUED | OUTPATIENT
Start: 2021-05-13 | End: 2021-05-13 | Stop reason: SURG

## 2021-05-13 RX ORDER — BUPIVACAINE HYDROCHLORIDE AND EPINEPHRINE 5; 5 MG/ML; UG/ML
INJECTION, SOLUTION PERINEURAL AS NEEDED
Status: DISCONTINUED | OUTPATIENT
Start: 2021-05-13 | End: 2021-05-13 | Stop reason: HOSPADM

## 2021-05-13 RX ORDER — HEPARIN SODIUM 5000 [USP'U]/ML
INJECTION, SOLUTION INTRAVENOUS; SUBCUTANEOUS AS NEEDED
Status: DISCONTINUED | OUTPATIENT
Start: 2021-05-13 | End: 2021-05-13 | Stop reason: HOSPADM

## 2021-05-13 RX ORDER — HYDROCODONE BITARTRATE AND ACETAMINOPHEN 5; 325 MG/1; MG/1
1 TABLET ORAL ONCE AS NEEDED
Status: DISCONTINUED | OUTPATIENT
Start: 2021-05-13 | End: 2021-05-13 | Stop reason: HOSPADM

## 2021-05-13 RX ORDER — CEFAZOLIN SODIUM 2 G/100ML
2 INJECTION, SOLUTION INTRAVENOUS ONCE
Status: COMPLETED | OUTPATIENT
Start: 2021-05-13 | End: 2021-05-13

## 2021-05-13 RX ORDER — PROMETHAZINE HYDROCHLORIDE 25 MG/1
25 TABLET ORAL ONCE AS NEEDED
Status: DISCONTINUED | OUTPATIENT
Start: 2021-05-13 | End: 2021-05-13 | Stop reason: HOSPADM

## 2021-05-13 RX ORDER — HYDROCODONE BITARTRATE AND ACETAMINOPHEN 5; 325 MG/1; MG/1
1 TABLET ORAL ONCE AS NEEDED
Status: DISCONTINUED | OUTPATIENT
Start: 2021-05-13 | End: 2021-05-14 | Stop reason: HOSPADM

## 2021-05-13 RX ORDER — DEXAMETHASONE SODIUM PHOSPHATE 4 MG/ML
INJECTION, SOLUTION INTRA-ARTICULAR; INTRALESIONAL; INTRAMUSCULAR; INTRAVENOUS; SOFT TISSUE AS NEEDED
Status: DISCONTINUED | OUTPATIENT
Start: 2021-05-13 | End: 2021-05-13 | Stop reason: SURG

## 2021-05-13 RX ORDER — ACETAMINOPHEN 325 MG/1
650 TABLET ORAL EVERY 4 HOURS PRN
Status: DISCONTINUED | OUTPATIENT
Start: 2021-05-13 | End: 2021-05-14 | Stop reason: HOSPADM

## 2021-05-13 RX ORDER — FENTANYL CITRATE 50 UG/ML
50 INJECTION, SOLUTION INTRAMUSCULAR; INTRAVENOUS
Status: DISCONTINUED | OUTPATIENT
Start: 2021-05-13 | End: 2021-05-13 | Stop reason: HOSPADM

## 2021-05-13 RX ORDER — ACETAMINOPHEN 500 MG
1000 TABLET ORAL ONCE
Status: COMPLETED | OUTPATIENT
Start: 2021-05-13 | End: 2021-05-13

## 2021-05-13 RX ORDER — HEPARIN SODIUM 5000 [USP'U]/ML
5000 INJECTION, SOLUTION INTRAVENOUS; SUBCUTANEOUS ONCE
Status: DISCONTINUED | OUTPATIENT
Start: 2021-05-13 | End: 2021-05-13 | Stop reason: HOSPADM

## 2021-05-13 RX ORDER — MAGNESIUM HYDROXIDE 1200 MG/15ML
LIQUID ORAL AS NEEDED
Status: DISCONTINUED | OUTPATIENT
Start: 2021-05-13 | End: 2021-05-13 | Stop reason: HOSPADM

## 2021-05-13 RX ORDER — FAMOTIDINE 20 MG/1
20 TABLET, FILM COATED ORAL ONCE
Status: COMPLETED | OUTPATIENT
Start: 2021-05-13 | End: 2021-05-13

## 2021-05-13 RX ORDER — LIDOCAINE HYDROCHLORIDE 40 MG/ML
SOLUTION TOPICAL AS NEEDED
Status: DISCONTINUED | OUTPATIENT
Start: 2021-05-13 | End: 2021-05-13 | Stop reason: SURG

## 2021-05-13 RX ORDER — NEOSTIGMINE METHYLSULFATE 1 MG/ML
INJECTION, SOLUTION INTRAVENOUS AS NEEDED
Status: DISCONTINUED | OUTPATIENT
Start: 2021-05-13 | End: 2021-05-13 | Stop reason: SURG

## 2021-05-13 RX ORDER — MELOXICAM 7.5 MG/1
7.5 TABLET ORAL DAILY
Qty: 20 TABLET | Refills: 0 | Status: SHIPPED | OUTPATIENT
Start: 2021-05-13 | End: 2022-09-12

## 2021-05-13 RX ORDER — NALOXONE HCL 0.4 MG/ML
0.4 VIAL (ML) INJECTION AS NEEDED
Status: DISCONTINUED | OUTPATIENT
Start: 2021-05-13 | End: 2021-05-13 | Stop reason: HOSPADM

## 2021-05-13 RX ORDER — HYDROCODONE BITARTRATE AND ACETAMINOPHEN 5; 325 MG/1; MG/1
1 TABLET ORAL EVERY 6 HOURS PRN
Status: DISCONTINUED | OUTPATIENT
Start: 2021-05-13 | End: 2021-05-14 | Stop reason: HOSPADM

## 2021-05-13 RX ADMIN — FENTANYL CITRATE 50 MCG: 50 INJECTION, SOLUTION INTRAMUSCULAR; INTRAVENOUS at 09:08

## 2021-05-13 RX ADMIN — HYDROMORPHONE HYDROCHLORIDE 0.5 MG: 1 INJECTION, SOLUTION INTRAMUSCULAR; INTRAVENOUS; SUBCUTANEOUS at 09:37

## 2021-05-13 RX ADMIN — ONDANSETRON 4 MG: 2 INJECTION INTRAMUSCULAR; INTRAVENOUS at 08:22

## 2021-05-13 RX ADMIN — HYDROMORPHONE HYDROCHLORIDE 0.5 MG: 1 INJECTION, SOLUTION INTRAMUSCULAR; INTRAVENOUS; SUBCUTANEOUS at 11:08

## 2021-05-13 RX ADMIN — DEXAMETHASONE SODIUM PHOSPHATE 8 MG: 4 INJECTION, SOLUTION INTRA-ARTICULAR; INTRALESIONAL; INTRAMUSCULAR; INTRAVENOUS; SOFT TISSUE at 07:41

## 2021-05-13 RX ADMIN — Medication 15 MG: at 07:41

## 2021-05-13 RX ADMIN — DROPERIDOL 0.62 MG: 2.5 INJECTION, SOLUTION INTRAMUSCULAR; INTRAVENOUS at 09:25

## 2021-05-13 RX ADMIN — ONDANSETRON 4 MG: 2 INJECTION INTRAMUSCULAR; INTRAVENOUS at 11:07

## 2021-05-13 RX ADMIN — SODIUM CHLORIDE, POTASSIUM CHLORIDE, SODIUM LACTATE AND CALCIUM CHLORIDE: 600; 310; 30; 20 INJECTION, SOLUTION INTRAVENOUS at 07:35

## 2021-05-13 RX ADMIN — ROCURONIUM BROMIDE 40 MG: 10 INJECTION INTRAVENOUS at 07:41

## 2021-05-13 RX ADMIN — ROCURONIUM BROMIDE 5 MG: 10 INJECTION INTRAVENOUS at 08:18

## 2021-05-13 RX ADMIN — FENTANYL CITRATE 25 MCG: 50 INJECTION, SOLUTION INTRAMUSCULAR; INTRAVENOUS at 08:04

## 2021-05-13 RX ADMIN — CEFAZOLIN SODIUM 2 G: 2 INJECTION, SOLUTION INTRAVENOUS at 07:42

## 2021-05-13 RX ADMIN — PROPOFOL 25 MCG/KG/MIN: 10 INJECTION, EMULSION INTRAVENOUS at 07:48

## 2021-05-13 RX ADMIN — NEOSTIGMINE 2.5 MG: 1 INJECTION INTRAVENOUS at 08:35

## 2021-05-13 RX ADMIN — HYDROCODONE BITARTRATE AND ACETAMINOPHEN 1 TABLET: 5; 325 TABLET ORAL at 15:17

## 2021-05-13 RX ADMIN — FAMOTIDINE 20 MG: 20 TABLET ORAL at 07:11

## 2021-05-13 RX ADMIN — ACETAMINOPHEN 1000 MG: 500 TABLET ORAL at 07:11

## 2021-05-13 RX ADMIN — Medication 15 MG: at 08:34

## 2021-05-13 RX ADMIN — HYDROCODONE BITARTRATE AND ACETAMINOPHEN 1 TABLET: 5; 325 TABLET ORAL at 21:44

## 2021-05-13 RX ADMIN — POLYETHYLENE GLYCOL 3350 17 G: 17 POWDER, FOR SOLUTION ORAL at 21:45

## 2021-05-13 RX ADMIN — PROPOFOL 60 MG: 10 INJECTION, EMULSION INTRAVENOUS at 07:41

## 2021-05-13 RX ADMIN — FENTANYL CITRATE 50 MCG: 50 INJECTION, SOLUTION INTRAMUSCULAR; INTRAVENOUS at 08:40

## 2021-05-13 RX ADMIN — LIDOCAINE HYDROCHLORIDE 1 EACH: 40 SOLUTION TOPICAL at 07:41

## 2021-05-13 RX ADMIN — FENTANYL CITRATE 50 MCG: 50 INJECTION, SOLUTION INTRAMUSCULAR; INTRAVENOUS at 08:57

## 2021-05-13 RX ADMIN — FENTANYL CITRATE 25 MCG: 50 INJECTION, SOLUTION INTRAMUSCULAR; INTRAVENOUS at 07:41

## 2021-05-13 RX ADMIN — GABAPENTIN 600 MG: 300 CAPSULE ORAL at 07:11

## 2021-05-13 RX ADMIN — GLYCOPYRROLATE 0.4 MG: 0.4 INJECTION INTRAMUSCULAR; INTRAVENOUS at 08:35

## 2021-05-13 RX ADMIN — Medication 2.5 MG: at 21:44

## 2021-05-13 RX ADMIN — GLYCOPYRROLATE 0.2 MG: 0.4 INJECTION INTRAMUSCULAR; INTRAVENOUS at 07:41

## 2021-05-14 VITALS
TEMPERATURE: 98.3 F | BODY MASS INDEX: 30.12 KG/M2 | DIASTOLIC BLOOD PRESSURE: 57 MMHG | RESPIRATION RATE: 18 BRPM | HEART RATE: 58 BPM | OXYGEN SATURATION: 92 % | HEIGHT: 66 IN | WEIGHT: 187.39 LBS | SYSTOLIC BLOOD PRESSURE: 107 MMHG

## 2021-05-14 LAB
CYTO UR: NORMAL
LAB AP CASE REPORT: NORMAL
LAB AP CLINICAL INFORMATION: NORMAL
PATH REPORT.FINAL DX SPEC: NORMAL
PATH REPORT.GROSS SPEC: NORMAL

## 2021-05-14 PROCEDURE — G0378 HOSPITAL OBSERVATION PER HR: HCPCS

## 2021-05-14 PROCEDURE — 25010000002 ONDANSETRON PER 1 MG: Performed by: OBSTETRICS & GYNECOLOGY

## 2021-05-14 RX ORDER — ONDANSETRON 2 MG/ML
4 INJECTION INTRAMUSCULAR; INTRAVENOUS EVERY 6 HOURS PRN
Status: DISCONTINUED | OUTPATIENT
Start: 2021-05-14 | End: 2021-05-14 | Stop reason: HOSPADM

## 2021-05-14 RX ADMIN — MELOXICAM 7.5 MG: 7.5 TABLET ORAL at 01:21

## 2021-05-14 RX ADMIN — ONDANSETRON 4 MG: 2 INJECTION INTRAMUSCULAR; INTRAVENOUS at 01:36

## 2021-05-14 RX ADMIN — ACETAMINOPHEN 650 MG: 325 TABLET ORAL at 10:05

## 2021-05-14 RX ADMIN — POLYETHYLENE GLYCOL 3350 17 G: 17 POWDER, FOR SOLUTION ORAL at 09:54

## 2021-05-14 RX ADMIN — HYDROCODONE BITARTRATE AND ACETAMINOPHEN 1 TABLET: 5; 325 TABLET ORAL at 05:34

## 2022-09-12 PROCEDURE — U0004 COV-19 TEST NON-CDC HGH THRU: HCPCS | Performed by: PHYSICIAN ASSISTANT

## 2022-10-10 ENCOUNTER — TRANSCRIBE ORDERS (OUTPATIENT)
Dept: ADMINISTRATIVE | Facility: HOSPITAL | Age: 42
End: 2022-10-10

## 2022-10-10 DIAGNOSIS — N64.4 BREAST PAIN IN FEMALE: Primary | ICD-10-CM

## 2022-11-15 ENCOUNTER — APPOINTMENT (OUTPATIENT)
Dept: MAMMOGRAPHY | Facility: HOSPITAL | Age: 42
End: 2022-11-15

## 2023-04-07 ENCOUNTER — TRANSCRIBE ORDERS (OUTPATIENT)
Dept: LAB | Facility: HOSPITAL | Age: 43
End: 2023-04-07
Payer: COMMERCIAL

## 2023-04-07 ENCOUNTER — LAB (OUTPATIENT)
Dept: LAB | Facility: HOSPITAL | Age: 43
End: 2023-04-07
Payer: COMMERCIAL

## 2023-04-07 DIAGNOSIS — R63.5 ABNORMAL WEIGHT GAIN: ICD-10-CM

## 2023-04-07 DIAGNOSIS — R63.5 ABNORMAL WEIGHT GAIN: Primary | ICD-10-CM

## 2023-04-07 LAB
25(OH)D3 SERPL-MCNC: 19.1 NG/ML (ref 30–100)
HBA1C MFR BLD: 4.9 % (ref 4.8–5.6)
MAGNESIUM SERPL-MCNC: 2.4 MG/DL (ref 1.6–2.6)
T3 SERPL-MCNC: 85.6 NG/DL (ref 80–200)
T3FREE SERPL-MCNC: 2.83 PG/ML (ref 2–4.4)
TSH SERPL DL<=0.05 MIU/L-ACNC: 1.48 UIU/ML (ref 0.27–4.2)
VIT B12 BLD-MCNC: 390 PG/ML (ref 211–946)

## 2023-04-07 PROCEDURE — 83036 HEMOGLOBIN GLYCOSYLATED A1C: CPT

## 2023-04-07 PROCEDURE — 86376 MICROSOMAL ANTIBODY EACH: CPT

## 2023-04-07 PROCEDURE — 83735 ASSAY OF MAGNESIUM: CPT

## 2023-04-07 PROCEDURE — 82607 VITAMIN B-12: CPT

## 2023-04-07 PROCEDURE — 84443 ASSAY THYROID STIM HORMONE: CPT

## 2023-04-07 PROCEDURE — 36415 COLL VENOUS BLD VENIPUNCTURE: CPT

## 2023-04-07 PROCEDURE — 82306 VITAMIN D 25 HYDROXY: CPT

## 2023-04-07 PROCEDURE — 83525 ASSAY OF INSULIN: CPT

## 2023-04-07 PROCEDURE — 84481 FREE ASSAY (FT-3): CPT

## 2023-04-08 LAB — THYROPEROXIDASE AB SERPL-ACNC: <9 IU/ML (ref 0–34)

## 2023-04-12 LAB — INSULIN SERPL-ACNC: 42 UIU/ML

## 2024-10-09 ENCOUNTER — LAB (OUTPATIENT)
Dept: LAB | Facility: HOSPITAL | Age: 44
End: 2024-10-09
Payer: COMMERCIAL

## 2024-10-09 ENCOUNTER — TRANSCRIBE ORDERS (OUTPATIENT)
Dept: LAB | Facility: HOSPITAL | Age: 44
End: 2024-10-09
Payer: COMMERCIAL

## 2024-10-09 DIAGNOSIS — N95.1 SYMPTOMATIC MENOPAUSAL OR FEMALE CLIMACTERIC STATES: ICD-10-CM

## 2024-10-09 DIAGNOSIS — N95.1 SYMPTOMATIC MENOPAUSAL OR FEMALE CLIMACTERIC STATES: Primary | ICD-10-CM

## 2024-10-09 PROCEDURE — 36415 COLL VENOUS BLD VENIPUNCTURE: CPT

## 2024-10-09 PROCEDURE — 82670 ASSAY OF TOTAL ESTRADIOL: CPT

## 2024-10-09 PROCEDURE — 83001 ASSAY OF GONADOTROPIN (FSH): CPT

## 2024-10-09 PROCEDURE — 84144 ASSAY OF PROGESTERONE: CPT

## 2024-10-09 PROCEDURE — 83002 ASSAY OF GONADOTROPIN (LH): CPT

## 2024-10-09 PROCEDURE — 84403 ASSAY OF TOTAL TESTOSTERONE: CPT

## 2024-10-10 LAB
ESTRADIOL SERPL HS-MCNC: 40.7 PG/ML
FSH SERPL-ACNC: 7.33 MIU/ML
LH SERPL-ACNC: 12.4 MIU/ML
PROGEST SERPL-MCNC: 0.22 NG/ML
TESTOST SERPL-MCNC: 26.1 NG/DL (ref 8.4–48.1)

## 2025-03-07 ENCOUNTER — LAB (OUTPATIENT)
Dept: LAB | Facility: HOSPITAL | Age: 45
End: 2025-03-07
Payer: COMMERCIAL

## 2025-03-07 ENCOUNTER — TRANSCRIBE ORDERS (OUTPATIENT)
Dept: LAB | Facility: HOSPITAL | Age: 45
End: 2025-03-07
Payer: COMMERCIAL

## 2025-03-07 DIAGNOSIS — R63.5 ABNORMAL WEIGHT GAIN: ICD-10-CM

## 2025-03-07 DIAGNOSIS — R63.5 ABNORMAL WEIGHT GAIN: Primary | ICD-10-CM

## 2025-03-07 LAB
25(OH)D3 SERPL-MCNC: 19.7 NG/ML (ref 30–100)
ALBUMIN SERPL-MCNC: 4.2 G/DL (ref 3.5–5.2)
ALBUMIN/GLOB SERPL: 1.4 G/DL
ALP SERPL-CCNC: 65 U/L (ref 39–117)
ALT SERPL W P-5'-P-CCNC: 11 U/L (ref 1–33)
ANION GAP SERPL CALCULATED.3IONS-SCNC: 9.2 MMOL/L (ref 5–15)
AST SERPL-CCNC: 17 U/L (ref 1–32)
BILIRUB SERPL-MCNC: 0.5 MG/DL (ref 0–1.2)
BUN SERPL-MCNC: 15 MG/DL (ref 6–20)
BUN/CREAT SERPL: 24.2 (ref 7–25)
CALCIUM SPEC-SCNC: 9.1 MG/DL (ref 8.6–10.5)
CHLORIDE SERPL-SCNC: 106 MMOL/L (ref 98–107)
CHOLEST SERPL-MCNC: 209 MG/DL (ref 0–200)
CO2 SERPL-SCNC: 25.8 MMOL/L (ref 22–29)
CREAT SERPL-MCNC: 0.62 MG/DL (ref 0.57–1)
DEPRECATED RDW RBC AUTO: 39.3 FL (ref 37–54)
EGFRCR SERPLBLD CKD-EPI 2021: 114.2 ML/MIN/1.73
ERYTHROCYTE [DISTWIDTH] IN BLOOD BY AUTOMATED COUNT: 11.8 % (ref 12.3–15.4)
GLOBULIN UR ELPH-MCNC: 2.9 GM/DL
GLUCOSE SERPL-MCNC: 74 MG/DL (ref 65–99)
HBA1C MFR BLD: 4.7 % (ref 4.8–5.6)
HCT VFR BLD AUTO: 41 % (ref 34–46.6)
HDLC SERPL-MCNC: 52 MG/DL (ref 40–60)
HGB BLD-MCNC: 13.9 G/DL (ref 12–15.9)
LDLC SERPL CALC-MCNC: 148 MG/DL (ref 0–100)
LDLC/HDLC SERPL: 2.83 {RATIO}
MCH RBC QN AUTO: 30.8 PG (ref 26.6–33)
MCHC RBC AUTO-ENTMCNC: 33.9 G/DL (ref 31.5–35.7)
MCV RBC AUTO: 90.9 FL (ref 79–97)
PLATELET # BLD AUTO: 370 10*3/MM3 (ref 140–450)
PMV BLD AUTO: 9.7 FL (ref 6–12)
POTASSIUM SERPL-SCNC: 4.2 MMOL/L (ref 3.5–5.2)
PROT SERPL-MCNC: 7.1 G/DL (ref 6–8.5)
RBC # BLD AUTO: 4.51 10*6/MM3 (ref 3.77–5.28)
SODIUM SERPL-SCNC: 141 MMOL/L (ref 136–145)
T4 FREE SERPL-MCNC: 1.1 NG/DL (ref 0.92–1.68)
TRIGL SERPL-MCNC: 50 MG/DL (ref 0–150)
TSH SERPL DL<=0.05 MIU/L-ACNC: 1.18 UIU/ML (ref 0.27–4.2)
VIT B12 BLD-MCNC: 533 PG/ML (ref 211–946)
VLDLC SERPL-MCNC: 9 MG/DL (ref 5–40)
WBC NRBC COR # BLD AUTO: 8.55 10*3/MM3 (ref 3.4–10.8)

## 2025-03-07 PROCEDURE — 36415 COLL VENOUS BLD VENIPUNCTURE: CPT

## 2025-03-07 PROCEDURE — 84439 ASSAY OF FREE THYROXINE: CPT

## 2025-03-07 PROCEDURE — 80050 GENERAL HEALTH PANEL: CPT

## 2025-03-07 PROCEDURE — 82306 VITAMIN D 25 HYDROXY: CPT

## 2025-03-07 PROCEDURE — 80061 LIPID PANEL: CPT

## 2025-03-07 PROCEDURE — 83036 HEMOGLOBIN GLYCOSYLATED A1C: CPT

## 2025-03-07 PROCEDURE — 82607 VITAMIN B-12: CPT

## 2025-07-15 NOTE — DISCHARGE SUMMARY
This is correct. A CT would not be required at this point. We will call regarding her XR results.    [facility]  Post Operative Discharge Summary      Patient: Margarita Adair        MR#:[id number]    Admission  Diagnosis: <principal problem not specified>  Discharge Diagnosis:   Patient Active Problem List   Diagnosis   • PCOS (polycystic ovarian syndrome)   • Labial abscess   • Overweight   • Menorrhagia       Date of Admission: 5/13/2021  Date of Discharge:  5/14/2021     Procedures: Robotic hysterectomy with bilateral salpingectomy             Service:  Gynecology    Hospital Course:  Patient underwent  and remained in the hospital for 0 days.  During that time she remained afebrile and hemodynamically stable.  On the day of discharge, she was eating, ambulating and voiding without difficulty.      Labs:    Lab Results (last 24 hours)     Procedure Component Value Units Date/Time    Tissue Pathology Exam [[id number]] Collected: 05/13/21 0813    Specimen: Tissue from Uterus, Cervix, Bilateral Fallopian Tubes  Updated: 05/14/21 1057     Case Report --     Surgical Pathology Report                         Case: [id number]                                  Authorizing Provider:  Delmi Lynn MD      Collected:           05/13/2021 08:13 AM          Ordering Location:     [facility]   Received:            05/13/2021 09:00 AM                                 OR                                                                           Pathologist:           Berhane Sin MD                                                           Specimen:    Uterus, Cervix, Bilateral Fallopian Tubes                                                   Clinical Information --     Excessive bleeding in premenopausal period, menorrhagia       Final Diagnosis --     UTERUS WITH BILATERAL FALLOPIAN TUBES, HYSTERECTOMY:                 Disordered proliferative endometrium with benign endometrial polyp                 Chronic cervicitis without dysplasia                 Unremarkable myometrium and serosa                  Bilateral fallopian tubes without pathologic abnormality       Gross Description --     Received in formalin labeled as uterus, cervix, bilateral fallopian tubes is a 10.2 cm superior to inferior, 6.5 cm cornu to cornu, 4 cm anterior to posterior, 147.2 g simple hysterectomy with bilateral attached fallopian tubes.  The ovaries are absent.  The serosa is smooth, tan-pink and glistening.  The tan-pink glistening ectocervix is 3.5 x 3.5 cm and the slitlike os is 0.9 x 0.1 cm.  The endocervix is tan-red and corrugated.  No endocervical masses or polyps are grossly identified.  The triangular endometrial cavity is 6 cm in length with a cornu to cornu width of 3 cm.  The endometrium is smooth, tan-red and glistening.  A 0.5 x 0.5 x 0.2 cm possible endometrial polyp is present within the anterior lower uterine segment.  The endometrial thickness averages 0.1 cm and the myometrial thickness averages 2.2 cm.  The myometrium is firm, tan and trabecular.  No myometrial nodules or other gross lesions are identified.  The bilateral tortuous, fimbriated fallopian tubes average 5 cm in length by 0.6 cm in diameter.  Sectioning of both tubes reveals a grossly unremarkable lumen.  Representative sections are submitted as follows: 1A-anterior cervix; 1B-posterior cervix; 1C-anterior endomyometrium with possible polyp; 1D-posterior endomyometrium; 1E-right fallopian tube and fimbria; 1F-left fallopian tube and fimbria.  LDP          Microscopic Description --     The slides are reviewed and demonstrate histopathologic features supporting the above rendered diagnosis.              Discharge Medications     Discharge Medications      New Medications      Instructions Start Date   HYDROcodone-Acetaminophen 5-300 MG per tablet  Commonly known as: XODOL   1 tablet, Oral, Every 6 Hours PRN      HYDROcodone-acetaminophen 5-325 MG per tablet  Commonly known as: NORCO   1 tablet, Oral, Every 6 Hours PRN      meloxicam 7.5 MG  tablet  Commonly known as: MOBIC   7.5 mg, Oral, Daily, Take daily x 7 days. Then only as needed.      polyethylene glycol 17 GM/SCOOP powder  Commonly known as: MIRALAX   17 g, Oral, Daily, Take daily x 2 weeks to prevent constipation, then as needed. May hold for loose stool         Changes to Medications      Instructions Start Date   acetaminophen 325 MG tablet  Commonly known as: TYLENOL  What changed: additional instructions   650 mg, Oral, Every 4 Hours PRN, Take every 4 hours while awake for 7 days then only as needed.      spironolactone 100 MG tablet  Commonly known as: ALDACTONE  What changed:   · how much to take  · how to take this  · when to take this   Hold medication until follow up in office/ repeat BP check         Continue These Medications      Instructions Start Date   melatonin 5 MG tablet tablet   5 mg, Oral, 1/2 tab QHS PRN          Stop These Medications    ibuprofen 600 MG tablet  Commonly known as: ADVIL,MOTRIN     phentermine 15 MG capsule            Discharge Disposition:  To Home    Discharge Condition:  Stable    Discharge Diet: regular    Activity at Discharge: pelvic rest    Follow-up Appointments  4 weeks    Delmi Lynn MD  05/20/21  10:41 EDT

## (undated) DEVICE — DRAPE,TOP,102X53,STERILE: Brand: MEDLINE

## (undated) DEVICE — DRP ADAPT ALLY UTER POSTN SYS 1P/U

## (undated) DEVICE — SUT VIC 2/0 CT2 27IN J269H

## (undated) DEVICE — BLADELESS OBTURATOR: Brand: WECK VISTA

## (undated) DEVICE — OCCL COLPO PNEUMO  STRL

## (undated) DEVICE — ANTIBACTERIAL UNDYED BRAIDED (POLYGLACTIN 910), SYNTHETIC ABSORBABLE SUTURE: Brand: COATED VICRYL

## (undated) DEVICE — SNAP KOVER: Brand: UNBRANDED

## (undated) DEVICE — ADHS SKIN PREMIERPRO EXOFIN TOPICAL HI/VISC .5ML

## (undated) DEVICE — MANIP UTER RUMI 2 KOH EFFICIENT 3.5CM BL

## (undated) DEVICE — ANTIBACTERIAL UNDYED BRAIDED (POLYGLACTIN 910), SYNTHETIC ABSORBABLE SURGICAL SUTURE: Brand: COATED VICRYL

## (undated) DEVICE — APPL CHLORAPREP TINTED 26ML TEAL

## (undated) DEVICE — ARM DRAPE

## (undated) DEVICE — TIP COVER ACCESSORY

## (undated) DEVICE — KT POSTN TRENDELENBURG NBXL PAD WING STRAP TABL HDRST XLG

## (undated) DEVICE — SUT MNCRYL PLS ANTIB UD 3/0 PS2 27IN

## (undated) DEVICE — COLUMN DRAPE

## (undated) DEVICE — GLV SURG DERMASSURE GRN LF PF 7.0

## (undated) DEVICE — ST TBG CONN PNEUMOCLEAR EVAC SMOKE HEAT/HUMID

## (undated) DEVICE — CANNULA SEAL

## (undated) DEVICE — PK MAJ GYN DAVINCI 10